# Patient Record
Sex: MALE | Race: WHITE | NOT HISPANIC OR LATINO | Employment: UNEMPLOYED | URBAN - METROPOLITAN AREA
[De-identification: names, ages, dates, MRNs, and addresses within clinical notes are randomized per-mention and may not be internally consistent; named-entity substitution may affect disease eponyms.]

---

## 2017-08-23 ENCOUNTER — APPOINTMENT (OUTPATIENT)
Dept: CT IMAGING | Facility: HOSPITAL | Age: 52
End: 2017-08-23

## 2017-08-23 ENCOUNTER — HOSPITAL ENCOUNTER (OUTPATIENT)
Facility: HOSPITAL | Age: 52
Setting detail: OBSERVATION
Discharge: HOME | End: 2017-08-24
Attending: EMERGENCY MEDICINE | Admitting: INTERNAL MEDICINE

## 2017-08-23 ENCOUNTER — OFFICE VISIT (OUTPATIENT)
Dept: RETAIL CLINIC | Facility: CLINIC | Age: 52
End: 2017-08-23

## 2017-08-23 VITALS
OXYGEN SATURATION: 98 % | RESPIRATION RATE: 18 BRPM | DIASTOLIC BLOOD PRESSURE: 60 MMHG | SYSTOLIC BLOOD PRESSURE: 92 MMHG | HEART RATE: 84 BPM | TEMPERATURE: 98.2 F

## 2017-08-23 DIAGNOSIS — K92.1 BLACK TARRY STOOLS: Primary | ICD-10-CM

## 2017-08-23 DIAGNOSIS — R10.30 LOWER ABDOMINAL PAIN: ICD-10-CM

## 2017-08-23 DIAGNOSIS — K92.2 GASTROINTESTINAL HEMORRHAGE, UNSPECIFIED GASTROINTESTINAL HEMORRHAGE TYPE: Primary | ICD-10-CM

## 2017-08-23 DIAGNOSIS — K63.1 SMALL BOWEL PERFORATION (HCC): ICD-10-CM

## 2017-08-23 DIAGNOSIS — I95.9 HYPOTENSION, UNSPECIFIED HYPOTENSION TYPE: ICD-10-CM

## 2017-08-23 DIAGNOSIS — F10.229 ALCOHOL DEPENDENCE WITH INTOXICATION WITH COMPLICATION (HCC): ICD-10-CM

## 2017-08-23 DIAGNOSIS — K92.1 MELENA: ICD-10-CM

## 2017-08-23 PROBLEM — K27.9 PUD (PEPTIC ULCER DISEASE): Status: ACTIVE | Noted: 2017-08-23

## 2017-08-23 PROBLEM — M54.50 CHRONIC LOW BACK PAIN: Status: ACTIVE | Noted: 2017-08-23

## 2017-08-23 PROBLEM — G89.29 CHRONIC LOW BACK PAIN: Status: ACTIVE | Noted: 2017-08-23

## 2017-08-23 PROBLEM — R10.84 GENERALIZED ABDOMINAL PAIN: Status: ACTIVE | Noted: 2017-08-23

## 2017-08-23 PROBLEM — F10.10 ALCOHOL ABUSE: Status: ACTIVE | Noted: 2017-08-23

## 2017-08-23 LAB
ABO GROUP BLD: NORMAL
ALBUMIN SERPL-MCNC: 3.8 G/DL (ref 3.5–5.2)
ALBUMIN/GLOB SERPL: 1.6 G/DL
ALP SERPL-CCNC: 44 U/L (ref 39–117)
ALT SERPL W P-5'-P-CCNC: 15 U/L (ref 1–41)
ANION GAP SERPL CALCULATED.3IONS-SCNC: 15.8 MMOL/L
AST SERPL-CCNC: 27 U/L (ref 1–40)
BASOPHILS # BLD AUTO: 0.08 10*3/MM3 (ref 0–0.2)
BASOPHILS NFR BLD AUTO: 1 % (ref 0–1.5)
BILIRUB SERPL-MCNC: <0.2 MG/DL (ref 0.1–1.2)
BLD GP AB SCN SERPL QL: NEGATIVE
BUN BLD-MCNC: 8 MG/DL (ref 6–20)
BUN/CREAT SERPL: 10.1 (ref 7–25)
CALCIUM SPEC-SCNC: 8.2 MG/DL (ref 8.6–10.5)
CHLORIDE SERPL-SCNC: 105 MMOL/L (ref 98–107)
CO2 SERPL-SCNC: 20.2 MMOL/L (ref 22–29)
CREAT BLD-MCNC: 0.79 MG/DL (ref 0.76–1.27)
D-LACTATE SERPL-SCNC: 1.9 MMOL/L (ref 0.5–2)
D-LACTATE SERPL-SCNC: 2.2 MMOL/L (ref 0.5–2)
DEPRECATED RDW RBC AUTO: 51.9 FL (ref 37–54)
EOSINOPHIL # BLD AUTO: 0.23 10*3/MM3 (ref 0–0.7)
EOSINOPHIL NFR BLD AUTO: 2.9 % (ref 0.3–6.2)
ERYTHROCYTE [DISTWIDTH] IN BLOOD BY AUTOMATED COUNT: 13.7 % (ref 11.5–14.5)
ETHANOL BLD-MCNC: 287 MG/DL (ref 0–10)
ETHANOL UR QL: 0.29 %
GFR SERPL CREATININE-BSD FRML MDRD: 102 ML/MIN/1.73
GLOBULIN UR ELPH-MCNC: 2.4 GM/DL
GLUCOSE BLD-MCNC: 86 MG/DL (ref 65–99)
HCT VFR BLD AUTO: 37 % (ref 40.4–52.2)
HGB BLD-MCNC: 12.4 G/DL (ref 13.7–17.6)
HOLD SPECIMEN: NORMAL
HOLD SPECIMEN: NORMAL
IMM GRANULOCYTES # BLD: 0 10*3/MM3 (ref 0–0.03)
IMM GRANULOCYTES NFR BLD: 0 % (ref 0–0.5)
INR PPP: 0.94 (ref 0.9–1.1)
LYMPHOCYTES # BLD AUTO: 3.66 10*3/MM3 (ref 0.9–4.8)
LYMPHOCYTES NFR BLD AUTO: 46.9 % (ref 19.6–45.3)
MCH RBC QN AUTO: 34.4 PG (ref 27–32.7)
MCHC RBC AUTO-ENTMCNC: 33.5 G/DL (ref 32.6–36.4)
MCV RBC AUTO: 102.8 FL (ref 79.8–96.2)
MONOCYTES # BLD AUTO: 0.77 10*3/MM3 (ref 0.2–1.2)
MONOCYTES NFR BLD AUTO: 9.9 % (ref 5–12)
NEUTROPHILS # BLD AUTO: 3.07 10*3/MM3 (ref 1.9–8.1)
NEUTROPHILS NFR BLD AUTO: 39.3 % (ref 42.7–76)
PLATELET # BLD AUTO: 247 10*3/MM3 (ref 140–500)
PMV BLD AUTO: 10 FL (ref 6–12)
POTASSIUM BLD-SCNC: 4.2 MMOL/L (ref 3.5–5.2)
PROT SERPL-MCNC: 6.2 G/DL (ref 6–8.5)
PROTHROMBIN TIME: 12.2 SECONDS (ref 11.7–14.2)
RBC # BLD AUTO: 3.6 10*6/MM3 (ref 4.6–6)
RH BLD: POSITIVE
SODIUM BLD-SCNC: 141 MMOL/L (ref 136–145)
WBC NRBC COR # BLD: 7.81 10*3/MM3 (ref 4.5–10.7)
WHOLE BLOOD HOLD SPECIMEN: NORMAL
WHOLE BLOOD HOLD SPECIMEN: NORMAL

## 2017-08-23 PROCEDURE — 36415 COLL VENOUS BLD VENIPUNCTURE: CPT | Performed by: EMERGENCY MEDICINE

## 2017-08-23 PROCEDURE — 96375 TX/PRO/DX INJ NEW DRUG ADDON: CPT

## 2017-08-23 PROCEDURE — G0378 HOSPITAL OBSERVATION PER HR: HCPCS

## 2017-08-23 PROCEDURE — 96365 THER/PROPH/DIAG IV INF INIT: CPT

## 2017-08-23 PROCEDURE — 86900 BLOOD TYPING SEROLOGIC ABO: CPT | Performed by: EMERGENCY MEDICINE

## 2017-08-23 PROCEDURE — 85610 PROTHROMBIN TIME: CPT | Performed by: PHYSICIAN ASSISTANT

## 2017-08-23 PROCEDURE — 96376 TX/PRO/DX INJ SAME DRUG ADON: CPT

## 2017-08-23 PROCEDURE — 86850 RBC ANTIBODY SCREEN: CPT | Performed by: EMERGENCY MEDICINE

## 2017-08-23 PROCEDURE — 85025 COMPLETE CBC W/AUTO DIFF WBC: CPT | Performed by: EMERGENCY MEDICINE

## 2017-08-23 PROCEDURE — 96366 THER/PROPH/DIAG IV INF ADDON: CPT

## 2017-08-23 PROCEDURE — 86901 BLOOD TYPING SEROLOGIC RH(D): CPT | Performed by: EMERGENCY MEDICINE

## 2017-08-23 PROCEDURE — 80307 DRUG TEST PRSMV CHEM ANLYZR: CPT | Performed by: PHYSICIAN ASSISTANT

## 2017-08-23 PROCEDURE — 83605 ASSAY OF LACTIC ACID: CPT | Performed by: EMERGENCY MEDICINE

## 2017-08-23 PROCEDURE — 25010000002 LORAZEPAM PER 2 MG: Performed by: INTERNAL MEDICINE

## 2017-08-23 PROCEDURE — 80053 COMPREHEN METABOLIC PANEL: CPT | Performed by: EMERGENCY MEDICINE

## 2017-08-23 PROCEDURE — 25010000002 MAGNESIUM SULFATE PER 500 MG OF MAGNESIUM: Performed by: INTERNAL MEDICINE

## 2017-08-23 PROCEDURE — 25010000002 THIAMINE PER 100 MG: Performed by: INTERNAL MEDICINE

## 2017-08-23 PROCEDURE — 74176 CT ABD & PELVIS W/O CONTRAST: CPT

## 2017-08-23 PROCEDURE — 99284 EMERGENCY DEPT VISIT MOD MDM: CPT

## 2017-08-23 PROCEDURE — 99213 OFFICE O/P EST LOW 20 MIN: CPT | Performed by: NURSE PRACTITIONER

## 2017-08-23 RX ORDER — SODIUM CHLORIDE 0.9 % (FLUSH) 0.9 %
1-10 SYRINGE (ML) INJECTION AS NEEDED
Status: DISCONTINUED | OUTPATIENT
Start: 2017-08-23 | End: 2017-08-24 | Stop reason: HOSPADM

## 2017-08-23 RX ORDER — FOLIC ACID 1 MG/1
1 TABLET ORAL DAILY
Status: DISCONTINUED | OUTPATIENT
Start: 2017-08-24 | End: 2017-08-24 | Stop reason: HOSPADM

## 2017-08-23 RX ORDER — SODIUM CHLORIDE 9 MG/ML
100 INJECTION, SOLUTION INTRAVENOUS CONTINUOUS
Status: DISCONTINUED | OUTPATIENT
Start: 2017-08-23 | End: 2017-08-23

## 2017-08-23 RX ORDER — ONDANSETRON 2 MG/ML
4 INJECTION INTRAMUSCULAR; INTRAVENOUS EVERY 6 HOURS PRN
Status: DISCONTINUED | OUTPATIENT
Start: 2017-08-23 | End: 2017-08-24 | Stop reason: HOSPADM

## 2017-08-23 RX ORDER — HYDROCODONE BITARTRATE AND ACETAMINOPHEN 5; 325 MG/1; MG/1
1 TABLET ORAL EVERY 4 HOURS PRN
Status: DISCONTINUED | OUTPATIENT
Start: 2017-08-23 | End: 2017-08-24 | Stop reason: HOSPADM

## 2017-08-23 RX ORDER — THIAMINE MONONITRATE (VIT B1) 100 MG
100 TABLET ORAL DAILY
Status: DISCONTINUED | OUTPATIENT
Start: 2017-08-24 | End: 2017-08-24 | Stop reason: HOSPADM

## 2017-08-23 RX ORDER — NITROGLYCERIN 0.4 MG/1
0.4 TABLET SUBLINGUAL
Status: DISCONTINUED | OUTPATIENT
Start: 2017-08-23 | End: 2017-08-24 | Stop reason: HOSPADM

## 2017-08-23 RX ORDER — LORAZEPAM 2 MG/ML
2 INJECTION INTRAMUSCULAR
Status: DISCONTINUED | OUTPATIENT
Start: 2017-08-23 | End: 2017-08-24 | Stop reason: HOSPADM

## 2017-08-23 RX ORDER — LORAZEPAM 2 MG/ML
1 INJECTION INTRAMUSCULAR
Status: DISCONTINUED | OUTPATIENT
Start: 2017-08-23 | End: 2017-08-24 | Stop reason: HOSPADM

## 2017-08-23 RX ORDER — OMEPRAZOLE 20 MG/1
20 CAPSULE, DELAYED RELEASE ORAL DAILY
COMMUNITY
End: 2017-08-24 | Stop reason: HOSPADM

## 2017-08-23 RX ORDER — DIPHENOXYLATE HYDROCHLORIDE AND ATROPINE SULFATE 2.5; .025 MG/1; MG/1
1 TABLET ORAL DAILY
Status: DISCONTINUED | OUTPATIENT
Start: 2017-08-24 | End: 2017-08-24 | Stop reason: HOSPADM

## 2017-08-23 RX ORDER — SODIUM CHLORIDE 9 MG/ML
100 INJECTION, SOLUTION INTRAVENOUS CONTINUOUS
Status: DISCONTINUED | OUTPATIENT
Start: 2017-08-26 | End: 2017-08-24 | Stop reason: HOSPADM

## 2017-08-23 RX ORDER — NAPROXEN SODIUM 220 MG
220 TABLET ORAL 2 TIMES DAILY PRN
COMMUNITY
End: 2017-08-24 | Stop reason: HOSPADM

## 2017-08-23 RX ORDER — SODIUM CHLORIDE 0.9 % (FLUSH) 0.9 %
10 SYRINGE (ML) INJECTION AS NEEDED
Status: DISCONTINUED | OUTPATIENT
Start: 2017-08-23 | End: 2017-08-24 | Stop reason: HOSPADM

## 2017-08-23 RX ORDER — PANTOPRAZOLE SODIUM 40 MG/10ML
80 INJECTION, POWDER, LYOPHILIZED, FOR SOLUTION INTRAVENOUS ONCE
Status: COMPLETED | OUTPATIENT
Start: 2017-08-23 | End: 2017-08-23

## 2017-08-23 RX ORDER — ACETAMINOPHEN 325 MG/1
650 TABLET ORAL EVERY 6 HOURS PRN
Status: DISCONTINUED | OUTPATIENT
Start: 2017-08-23 | End: 2017-08-24 | Stop reason: HOSPADM

## 2017-08-23 RX ORDER — LORAZEPAM 1 MG/1
2 TABLET ORAL
Status: DISCONTINUED | OUTPATIENT
Start: 2017-08-23 | End: 2017-08-24 | Stop reason: HOSPADM

## 2017-08-23 RX ORDER — LORAZEPAM 1 MG/1
1 TABLET ORAL
Status: DISCONTINUED | OUTPATIENT
Start: 2017-08-23 | End: 2017-08-24 | Stop reason: HOSPADM

## 2017-08-23 RX ORDER — ONDANSETRON 4 MG/1
4 TABLET, FILM COATED ORAL EVERY 6 HOURS PRN
Status: DISCONTINUED | OUTPATIENT
Start: 2017-08-23 | End: 2017-08-24 | Stop reason: HOSPADM

## 2017-08-23 RX ORDER — NICOTINE 21 MG/24HR
1 PATCH, TRANSDERMAL 24 HOURS TRANSDERMAL NIGHTLY
Status: DISCONTINUED | OUTPATIENT
Start: 2017-08-23 | End: 2017-08-24 | Stop reason: HOSPADM

## 2017-08-23 RX ORDER — ONDANSETRON 4 MG/1
4 TABLET, ORALLY DISINTEGRATING ORAL EVERY 6 HOURS PRN
Status: DISCONTINUED | OUTPATIENT
Start: 2017-08-23 | End: 2017-08-24 | Stop reason: HOSPADM

## 2017-08-23 RX ADMIN — NICOTINE 1 PATCH: 21 PATCH, EXTENDED RELEASE TRANSDERMAL at 20:50

## 2017-08-23 RX ADMIN — PANTOPRAZOLE SODIUM 8 MG/HR: 40 INJECTION, POWDER, FOR SOLUTION INTRAVENOUS at 19:43

## 2017-08-23 RX ADMIN — PANTOPRAZOLE SODIUM 8 MG/HR: 40 INJECTION, POWDER, FOR SOLUTION INTRAVENOUS at 15:25

## 2017-08-23 RX ADMIN — LORAZEPAM 2 MG: 2 INJECTION INTRAMUSCULAR; INTRAVENOUS at 18:38

## 2017-08-23 RX ADMIN — LORAZEPAM 1 MG: 2 INJECTION INTRAMUSCULAR; INTRAVENOUS at 22:15

## 2017-08-23 RX ADMIN — PANTOPRAZOLE SODIUM 80 MG: 40 INJECTION, POWDER, FOR SOLUTION INTRAVENOUS at 15:16

## 2017-08-23 RX ADMIN — FOLIC ACID 100 ML/HR: 5 INJECTION, SOLUTION INTRAMUSCULAR; INTRAVENOUS; SUBCUTANEOUS at 20:50

## 2017-08-23 NOTE — PLAN OF CARE
Problem: Fall Risk (Adult)  Goal: Identify Related Risk Factors and Signs and Symptoms  Outcome: Ongoing (interventions implemented as appropriate)    08/23/17 4984   Fall Risk   Fall Risk: Related Risk Factors slipper/uneven surfaces;environment unfamiliar;gait/mobility problems;confusion/agitation   Fall Risk: Signs and Symptoms presence of risk factors

## 2017-08-23 NOTE — PROGRESS NOTES
Subjective   Marcello Nguyen is a 52 y.o. male.     HPI Comments: Pt reports he has been having stomach pain, leg cramps and dark tarry stool. Reports dark tarry stools for 3 weeks. Leg cramps started couple days ago. Reports he has been drinking 2 beers daily. Reports he has had a hernia for appx 8 months     Abdominal Pain   This is a new problem. The current episode started 1 to 4 weeks ago (3 weeks). The onset quality is gradual. The problem occurs intermittently. Duration: 3  The problem has been waxing and waning. The pain is located in the generalized abdominal region. The pain is mild. The quality of the pain is dull. The abdominal pain does not radiate. Associated symptoms include nausea. Pertinent negatives include no fever or vomiting. Nothing aggravates the pain. The pain is relieved by nothing. Treatments tried: aleve. The treatment provided no relief. His past medical history is significant for abdominal surgery. hx of small intestine surgery        The following portions of the patient's history were reviewed and updated as appropriate: allergies, current medications, past family history, past medical history, past social history, past surgical history and problem list.    Review of Systems   Constitutional: Positive for chills. Negative for fever.   HENT: Negative.    Eyes: Negative.    Respiratory: Negative.    Cardiovascular: Negative.    Gastrointestinal: Positive for abdominal distention, abdominal pain and nausea. Negative for vomiting.        Black stool   Endocrine: Negative.    Genitourinary: Negative.    Musculoskeletal: Positive for back pain.   Skin: Negative.    Allergic/Immunologic: Negative.    Neurological: Negative.    Hematological: Negative.    Psychiatric/Behavioral: Negative.        Objective   Physical Exam   Constitutional: He is oriented to person, place, and time. Vital signs are normal. He appears well-developed and well-nourished.   Smells of strong alcohol   HENT:    Head: Normocephalic and atraumatic.   Right Ear: Hearing, tympanic membrane, external ear and ear canal normal.   Left Ear: Hearing, tympanic membrane, external ear and ear canal normal.   Nose: Nose normal. Right sinus exhibits no maxillary sinus tenderness and no frontal sinus tenderness. Left sinus exhibits no maxillary sinus tenderness and no frontal sinus tenderness.   Mouth/Throat: Uvula is midline, oropharynx is clear and moist and mucous membranes are normal. No tonsillar exudate.   Eyes: Conjunctivae and lids are normal. Pupils are equal, round, and reactive to light.   Neck: Trachea normal and normal range of motion. Neck supple.   Cardiovascular: Normal rate, regular rhythm, S1 normal, S2 normal and normal heart sounds.    Pulmonary/Chest: Effort normal and breath sounds normal.   Abdominal: Soft. Normal appearance. Bowel sounds are decreased. There is no tenderness. There is no rigidity, no rebound and no guarding. A hernia is present. Hernia confirmed positive in the ventral area.   Old midline abdominal scar noted    Musculoskeletal: Normal range of motion.   Lymphadenopathy:     He has no cervical adenopathy.   Neurological: He is alert and oriented to person, place, and time.   Skin: Skin is warm, dry and intact. No rash noted.   Psychiatric: He has a normal mood and affect. His speech is normal and behavior is normal.   Vitals reviewed.      Assessment/Plan   Problems Addressed this Visit     None      Visit Diagnoses     Black tarry stools    -  Primary    Hypotension, unspecified hypotension type        Lower abdominal pain

## 2017-08-23 NOTE — PATIENT INSTRUCTIONS
Go to ER asap. Pt understands the risk and consequences of not going to ER including bleeding which could lead to death. Pt understands he needs a higher level of care which may include labs, radiology, IV's. Reports sister in law will drive him to ER. Refuses EMS transport. Report called to [facility]

## 2017-08-23 NOTE — ED PROVIDER NOTES
"EMERGENCY DEPARTMENT ENCOUNTER    CHIEF COMPLAINT  Chief Complaint: abd pain  History given by:pt  History limited by:none  Room Number: 34/34  PMD: No Known Provider      HPI:  Pt is a 52 y.o. male who presents with a possible flare of his peptic ulcers. He began experiencing abdominal pain about 1-2 months ago and black/tarry stool about 1 month ago. Pt avoided coming to the ER until he was advised to come by his wife.     He has had 2 perforated ulcer repairs in the past, first one being in 2012 by Dr. Rivera and second in 2016. Pt admits to drinking EtOH (Beer) regularly and reports his last drink was this morning. He currently denies experiencing hematemesis or other symptoms.     Duration: about 1 or 2 months  Timing:constant  Location:unspecified  Radiation:none stated  Quality:\"pain\"   Intensity/Severity:moderate  Progression:unchanged  Associated Symptoms:black/tarry stool  Aggravating Factors:none stated  Alleviating Factors:none stated  Previous Episodes:Pt has a h/o ulcers  Treatment before arrival:none stated    MEDICAL RECORD REVIEW  Pt has a history of peptic ulcer disease    PAST MEDICAL HISTORY  Active Ambulatory Problems     Diagnosis Date Noted   • No Active Ambulatory Problems     Resolved Ambulatory Problems     Diagnosis Date Noted   • No Resolved Ambulatory Problems     Past Medical History:   Diagnosis Date   • Back pain    • History of stomach ulcers        PAST SURGICAL HISTORY  Past Surgical History:   Procedure Laterality Date   • COLON SURGERY         FAMILY HISTORY  Family History   Problem Relation Age of Onset   • Cancer Father        SOCIAL HISTORY  Social History     Social History   • Marital status: Unknown     Spouse name: N/A   • Number of children: N/A   • Years of education: N/A     Occupational History   • Not on file.     Social History Main Topics   • Smoking status: Current Every Day Smoker     Packs/day: 1.00   • Smokeless tobacco: Not on file   • Alcohol use Yes      " Comment: 2-3 beers a day   • Drug use: Yes     Special: Marijuana   • Sexual activity: Defer     Other Topics Concern   • Not on file     Social History Narrative   • No narrative on file       ALLERGIES  Review of patient's allergies indicates no known allergies.    REVIEW OF SYSTEMS  Review of Systems   Constitutional: Negative for activity change, appetite change and fever.   HENT: Negative for congestion and sore throat.    Eyes: Negative.    Respiratory: Negative for cough and shortness of breath.    Cardiovascular: Negative for chest pain and leg swelling.   Gastrointestinal: Positive for abdominal pain and blood in stool (black/tarry). Negative for diarrhea and vomiting (hematemesis included).   Endocrine: Negative.    Genitourinary: Negative for decreased urine volume and dysuria.   Musculoskeletal: Negative for neck pain.   Skin: Negative for rash and wound.   Allergic/Immunologic: Negative.    Neurological: Negative for weakness, numbness and headaches.   Hematological: Negative.    Psychiatric/Behavioral: Negative.    All other systems reviewed and are negative.      PHYSICAL EXAM  ED Triage Vitals   Temp Heart Rate Resp BP SpO2   08/23/17 1359 08/23/17 1359 08/23/17 1359 08/23/17 1359 08/23/17 1359   97 °F (36.1 °C) 93 18 106/66 98 %      Temp src Heart Rate Source Patient Position BP Location FiO2 (%)   08/23/17 1359 08/23/17 1359 08/23/17 1359 08/23/17 1359 --   Tympanic Monitor Standing Right arm        Physical Exam   Constitutional: He is oriented to person, place, and time and well-developed, well-nourished, and in no distress. No distress.   Smells of alcohol   HENT:   Head: Normocephalic and atraumatic.   Mouth/Throat: Oropharynx is clear and moist.   Eyes: EOM are normal. Pupils are equal, round, and reactive to light.   Neck: Normal range of motion. Neck supple.   Cardiovascular: Normal rate, regular rhythm and normal heart sounds.    Pulmonary/Chest: Effort normal and breath sounds normal. No  respiratory distress. He has no wheezes. He exhibits no tenderness.   Abdominal: Soft. He exhibits no distension. There is tenderness (epigastric). There is no rebound and no guarding.   Genitourinary: Rectal exam shows guaiac positive stool (black stool).   Musculoskeletal: Normal range of motion. He exhibits no edema.   Lymphadenopathy:     He has no cervical adenopathy.   Neurological: He is alert and oriented to person, place, and time. He has normal sensation and normal strength.   Skin: Skin is warm and dry. No rash noted. No pallor.   Psychiatric: Mood, memory, affect and judgment normal.   Nursing note and vitals reviewed.      LAB RESULTS  Recent Results (from the past 24 hour(s))   Comprehensive Metabolic Panel    Collection Time: 08/23/17  2:37 PM   Result Value Ref Range    Glucose 86 65 - 99 mg/dL    BUN 8 6 - 20 mg/dL    Creatinine 0.79 0.76 - 1.27 mg/dL    Sodium 141 136 - 145 mmol/L    Potassium 4.2 3.5 - 5.2 mmol/L    Chloride 105 98 - 107 mmol/L    CO2 20.2 (L) 22.0 - 29.0 mmol/L    Calcium 8.2 (L) 8.6 - 10.5 mg/dL    Total Protein 6.2 6.0 - 8.5 g/dL    Albumin 3.80 3.50 - 5.20 g/dL    ALT (SGPT) 15 1 - 41 U/L    AST (SGOT) 27 1 - 40 U/L    Alkaline Phosphatase 44 39 - 117 U/L    Total Bilirubin <0.2 0.1 - 1.2 mg/dL    eGFR Non African Amer 102 >60 mL/min/1.73    Globulin 2.4 gm/dL    A/G Ratio 1.6 g/dL    BUN/Creatinine Ratio 10.1 7.0 - 25.0    Anion Gap 15.8 mmol/L   Type & Screen    Collection Time: 08/23/17  2:37 PM   Result Value Ref Range    ABO Type A     RH type Positive     Antibody Screen Negative    Lactic Acid, Plasma    Collection Time: 08/23/17  2:37 PM   Result Value Ref Range    Lactate 2.2 (C) 0.5 - 2.0 mmol/L   Light Blue Top    Collection Time: 08/23/17  2:37 PM   Result Value Ref Range    Extra Tube hold for add-on    Green Top (Gel)    Collection Time: 08/23/17  2:37 PM   Result Value Ref Range    Extra Tube Hold for add-ons.    Lavender Top    Collection Time: 08/23/17  2:37  PM   Result Value Ref Range    Extra Tube hold for add-on    Gold Top - SST    Collection Time: 08/23/17  2:37 PM   Result Value Ref Range    Extra Tube Hold for add-ons.    CBC Auto Differential    Collection Time: 08/23/17  2:37 PM   Result Value Ref Range    WBC 7.81 4.50 - 10.70 10*3/mm3    RBC 3.60 (L) 4.60 - 6.00 10*6/mm3    Hemoglobin 12.4 (L) 13.7 - 17.6 g/dL    Hematocrit 37.0 (L) 40.4 - 52.2 %    .8 (H) 79.8 - 96.2 fL    MCH 34.4 (H) 27.0 - 32.7 pg    MCHC 33.5 32.6 - 36.4 g/dL    RDW 13.7 11.5 - 14.5 %    RDW-SD 51.9 37.0 - 54.0 fl    MPV 10.0 6.0 - 12.0 fL    Platelets 247 140 - 500 10*3/mm3    Neutrophil % 39.3 (L) 42.7 - 76.0 %    Lymphocyte % 46.9 (H) 19.6 - 45.3 %    Monocyte % 9.9 5.0 - 12.0 %    Eosinophil % 2.9 0.3 - 6.2 %    Basophil % 1.0 0.0 - 1.5 %    Immature Grans % 0.0 0.0 - 0.5 %    Neutrophils, Absolute 3.07 1.90 - 8.10 10*3/mm3    Lymphocytes, Absolute 3.66 0.90 - 4.80 10*3/mm3    Monocytes, Absolute 0.77 0.20 - 1.20 10*3/mm3    Eosinophils, Absolute 0.23 0.00 - 0.70 10*3/mm3    Basophils, Absolute 0.08 0.00 - 0.20 10*3/mm3    Immature Grans, Absolute 0.00 0.00 - 0.03 10*3/mm3   Ethanol    Collection Time: 08/23/17  2:37 PM   Result Value Ref Range    Ethanol 287 (C) 0 - 10 mg/dL    Ethanol % 0.287 %   Protime-INR    Collection Time: 08/23/17  2:37 PM   Result Value Ref Range    Protime 12.2 11.7 - 14.2 Seconds    INR 0.94 0.90 - 1.10       I ordered the above labs and reviewed the results    RADIOLOGY  CT Abdomen Pelvis Without Contrast      Negative for any acute process       I ordered the above noted radiological studies and reviewed the images on the PACS system.          COURSE & MEDICAL DECISION MAKING  Pertinent Labs and Imaging studies that were ordered and reviewed are noted above.  Results were reviewed/discussed with the patient and they were also made aware of online assess.  Pt also made aware that some labs, such as cultures, will not be resulted during ER visit and  "follow up with PMD is necessary.       PROGRESS AND CONSULTS    Progress Notes:    1409: Ordered CBC, lactic acid and CMP per protocol.     1500: Ordered EtOH and PT-INR for further evaluation. Ordered protonix for abd pain.     1517: Reviewed pt's history and workup with Dr. Howell.  After a bedside evaluation; Dr Howell agrees with the plan of care.    1720: Spoke with Dr. Marquez [facility] concerning pt's history, pertinent workup and concern for small bowel perforation and gastrointestinal hemorrhaging. He agrees to admit.     1723: Based on the patient's lab findings and presenting symptoms, the doctor and I feel it is appropriate to admit the patient for further management, evaluation, and treatment.  I have discussed this with the admitting team.  I have also discussed this with the patient/family.  They are in agreement with admission.        MEDICATIONS GIVEN IN ER  Medications   sodium chloride 0.9 % flush 10 mL (not administered)   pantoprazole (PROTONIX) injection 80 mg (80 mg Intravenous Given 8/23/17 1516)     And   pantoprazole (PROTONIX) 40 mg/100 mL (0.4 mg/mL) in 0.9% NS IVPB (8 mg/hr Intravenous New Bag 8/23/17 1525)       BP 93/58  Pulse 71  Temp 97 °F (36.1 °C) (Tympanic)   Resp 18  Ht 68\" (172.7 cm)  Wt 140 lb (63.5 kg)  SpO2 94%  BMI 21.29 kg/m2      DIAGNOSIS  Final diagnoses:   Gastrointestinal hemorrhage, unspecified gastrointestinal hemorrhage type   Alcohol dependence with intoxication with complication   Small bowel perforation- history of       Documentation assistance provided by melinda Jones for Caitlin Obrien PA-C.  Information recorded by the melinda was done at my direction and has been verified and validated by me.     Cadence Jones  08/23/17 1724       Caitlin Obrien PA-C  08/23/17 1729       Caitlin Obrien PA-C  10/05/17 1442    "

## 2017-08-23 NOTE — ED PROVIDER NOTES
Pt with a hx of peptic ulcers, presents to the ED complaining generalized abd pain for the last 1-2 months and black stool for the last month. Pt's ethanol=287 and Hgb=12.4. On exam, the pt has periumbilical and epigastric abd tenderness and he is tachycardic. I agree with the plan to order a CT Abd/Pelvis for further evaluation.     I supervised care provided by the midlevel provider.    We have discussed this patient's history, physical exam, and treatment plan.   I have reviewed the note and personally saw and examined the patient and agree with the plan of care.    Documentation assistance provided by mireille Cagle and Abraham Zuluaga for Dr. Howell.  Information recorded by the melinda was done at my direction and has been verified and validated by me.       Abraham Zuluaga  08/23/17 1531       Teresa Cagle  08/23/17 1607       Justin Howell MD  08/25/17 1100

## 2017-08-24 ENCOUNTER — ANESTHESIA (OUTPATIENT)
Dept: GASTROENTEROLOGY | Facility: HOSPITAL | Age: 52
End: 2017-08-24

## 2017-08-24 ENCOUNTER — ANESTHESIA EVENT (OUTPATIENT)
Dept: GASTROENTEROLOGY | Facility: HOSPITAL | Age: 52
End: 2017-08-24

## 2017-08-24 VITALS
RESPIRATION RATE: 14 BRPM | TEMPERATURE: 98.3 F | DIASTOLIC BLOOD PRESSURE: 81 MMHG | HEART RATE: 72 BPM | OXYGEN SATURATION: 96 % | WEIGHT: 124.6 LBS | HEIGHT: 68 IN | BODY MASS INDEX: 18.88 KG/M2 | SYSTOLIC BLOOD PRESSURE: 111 MMHG

## 2017-08-24 PROBLEM — R10.84 GENERALIZED ABDOMINAL PAIN: Status: RESOLVED | Noted: 2017-08-23 | Resolved: 2017-08-24

## 2017-08-24 PROBLEM — K92.1 MELENA: Status: RESOLVED | Noted: 2017-08-23 | Resolved: 2017-08-24

## 2017-08-24 LAB
ANION GAP SERPL CALCULATED.3IONS-SCNC: 11.8 MMOL/L
BASOPHILS # BLD AUTO: 0.07 10*3/MM3 (ref 0–0.2)
BASOPHILS NFR BLD AUTO: 0.9 % (ref 0–1.5)
BUN BLD-MCNC: 10 MG/DL (ref 6–20)
BUN/CREAT SERPL: 14.1 (ref 7–25)
CALCIUM SPEC-SCNC: 7.8 MG/DL (ref 8.6–10.5)
CHLORIDE SERPL-SCNC: 105 MMOL/L (ref 98–107)
CO2 SERPL-SCNC: 23.2 MMOL/L (ref 22–29)
CREAT BLD-MCNC: 0.71 MG/DL (ref 0.76–1.27)
D-LACTATE SERPL-SCNC: 1.1 MMOL/L (ref 0.5–2)
DEPRECATED RDW RBC AUTO: 50.6 FL (ref 37–54)
EOSINOPHIL # BLD AUTO: 0.25 10*3/MM3 (ref 0–0.7)
EOSINOPHIL NFR BLD AUTO: 3.2 % (ref 0.3–6.2)
ERYTHROCYTE [DISTWIDTH] IN BLOOD BY AUTOMATED COUNT: 13.4 % (ref 11.5–14.5)
GFR SERPL CREATININE-BSD FRML MDRD: 116 ML/MIN/1.73
GLUCOSE BLD-MCNC: 102 MG/DL (ref 65–99)
HCT VFR BLD AUTO: 34.7 % (ref 40.4–52.2)
HCT VFR BLD AUTO: 36.3 % (ref 40.4–52.2)
HCT VFR BLD AUTO: 37.8 % (ref 40.4–52.2)
HGB BLD-MCNC: 11.9 G/DL (ref 13.7–17.6)
HGB BLD-MCNC: 12.3 G/DL (ref 13.7–17.6)
HGB BLD-MCNC: 12.4 G/DL (ref 13.7–17.6)
IMM GRANULOCYTES # BLD: 0 10*3/MM3 (ref 0–0.03)
IMM GRANULOCYTES NFR BLD: 0 % (ref 0–0.5)
INR PPP: 1.02 (ref 0.9–1.1)
LYMPHOCYTES # BLD AUTO: 2.33 10*3/MM3 (ref 0.9–4.8)
LYMPHOCYTES NFR BLD AUTO: 30.3 % (ref 19.6–45.3)
MAGNESIUM SERPL-MCNC: 2.7 MG/DL (ref 1.6–2.6)
MCH RBC QN AUTO: 34.9 PG (ref 27–32.7)
MCHC RBC AUTO-ENTMCNC: 34.2 G/DL (ref 32.6–36.4)
MCV RBC AUTO: 102.3 FL (ref 79.8–96.2)
MONOCYTES # BLD AUTO: 0.67 10*3/MM3 (ref 0.2–1.2)
MONOCYTES NFR BLD AUTO: 8.7 % (ref 5–12)
NEUTROPHILS # BLD AUTO: 4.38 10*3/MM3 (ref 1.9–8.1)
NEUTROPHILS NFR BLD AUTO: 56.9 % (ref 42.7–76)
PLATELET # BLD AUTO: 234 10*3/MM3 (ref 140–500)
PMV BLD AUTO: 10.1 FL (ref 6–12)
POTASSIUM BLD-SCNC: 4.5 MMOL/L (ref 3.5–5.2)
PROTHROMBIN TIME: 13 SECONDS (ref 11.7–14.2)
RBC # BLD AUTO: 3.55 10*6/MM3 (ref 4.6–6)
SODIUM BLD-SCNC: 140 MMOL/L (ref 136–145)
WBC NRBC COR # BLD: 7.7 10*3/MM3 (ref 4.5–10.7)

## 2017-08-24 PROCEDURE — 88305 TISSUE EXAM BY PATHOLOGIST: CPT | Performed by: INTERNAL MEDICINE

## 2017-08-24 PROCEDURE — 80048 BASIC METABOLIC PNL TOTAL CA: CPT | Performed by: INTERNAL MEDICINE

## 2017-08-24 PROCEDURE — 43239 EGD BIOPSY SINGLE/MULTIPLE: CPT | Performed by: INTERNAL MEDICINE

## 2017-08-24 PROCEDURE — 85018 HEMOGLOBIN: CPT | Performed by: INTERNAL MEDICINE

## 2017-08-24 PROCEDURE — 96366 THER/PROPH/DIAG IV INF ADDON: CPT

## 2017-08-24 PROCEDURE — 87081 CULTURE SCREEN ONLY: CPT | Performed by: INTERNAL MEDICINE

## 2017-08-24 PROCEDURE — 99243 OFF/OP CNSLTJ NEW/EST LOW 30: CPT | Performed by: INTERNAL MEDICINE

## 2017-08-24 PROCEDURE — G0378 HOSPITAL OBSERVATION PER HR: HCPCS

## 2017-08-24 PROCEDURE — 25010000002 PROPOFOL 10 MG/ML EMULSION: Performed by: NURSE ANESTHETIST, CERTIFIED REGISTERED

## 2017-08-24 PROCEDURE — 85014 HEMATOCRIT: CPT | Performed by: INTERNAL MEDICINE

## 2017-08-24 PROCEDURE — 85025 COMPLETE CBC W/AUTO DIFF WBC: CPT | Performed by: INTERNAL MEDICINE

## 2017-08-24 PROCEDURE — 88312 SPECIAL STAINS GROUP 1: CPT | Performed by: INTERNAL MEDICINE

## 2017-08-24 PROCEDURE — 83605 ASSAY OF LACTIC ACID: CPT | Performed by: INTERNAL MEDICINE

## 2017-08-24 PROCEDURE — 85610 PROTHROMBIN TIME: CPT | Performed by: INTERNAL MEDICINE

## 2017-08-24 PROCEDURE — 83735 ASSAY OF MAGNESIUM: CPT | Performed by: INTERNAL MEDICINE

## 2017-08-24 RX ORDER — FENTANYL CITRATE 50 UG/ML
50 INJECTION, SOLUTION INTRAMUSCULAR; INTRAVENOUS
Status: DISCONTINUED | OUTPATIENT
Start: 2017-08-24 | End: 2017-08-24

## 2017-08-24 RX ORDER — SODIUM CHLORIDE, SODIUM LACTATE, POTASSIUM CHLORIDE, CALCIUM CHLORIDE 600; 310; 30; 20 MG/100ML; MG/100ML; MG/100ML; MG/100ML
INJECTION, SOLUTION INTRAVENOUS CONTINUOUS PRN
Status: DISCONTINUED | OUTPATIENT
Start: 2017-08-24 | End: 2017-08-24 | Stop reason: SURG

## 2017-08-24 RX ORDER — LIDOCAINE HYDROCHLORIDE 20 MG/ML
INJECTION, SOLUTION INFILTRATION; PERINEURAL AS NEEDED
Status: DISCONTINUED | OUTPATIENT
Start: 2017-08-24 | End: 2017-08-24 | Stop reason: SURG

## 2017-08-24 RX ORDER — ONDANSETRON 2 MG/ML
4 INJECTION INTRAMUSCULAR; INTRAVENOUS ONCE AS NEEDED
Status: DISCONTINUED | OUTPATIENT
Start: 2017-08-24 | End: 2017-08-24

## 2017-08-24 RX ORDER — ACETAMINOPHEN 325 MG/1
650 TABLET ORAL EVERY 6 HOURS PRN
Refills: 0
Start: 2017-08-24

## 2017-08-24 RX ORDER — SUCRALFATE ORAL 1 G/10ML
1 SUSPENSION ORAL
Qty: 560 ML | Refills: 0 | Status: SHIPPED | OUTPATIENT
Start: 2017-08-24 | End: 2017-09-07

## 2017-08-24 RX ORDER — PROMETHAZINE HYDROCHLORIDE 25 MG/1
25 SUPPOSITORY RECTAL ONCE AS NEEDED
Status: DISCONTINUED | OUTPATIENT
Start: 2017-08-24 | End: 2017-08-24

## 2017-08-24 RX ORDER — PROMETHAZINE HYDROCHLORIDE 25 MG/ML
6.25 INJECTION, SOLUTION INTRAMUSCULAR; INTRAVENOUS ONCE AS NEEDED
Status: DISCONTINUED | OUTPATIENT
Start: 2017-08-24 | End: 2017-08-24

## 2017-08-24 RX ORDER — NICOTINE 21 MG/24HR
1 PATCH, TRANSDERMAL 24 HOURS TRANSDERMAL NIGHTLY
Qty: 21 PATCH | Refills: 0 | Status: SHIPPED | OUTPATIENT
Start: 2017-08-24

## 2017-08-24 RX ORDER — PROPOFOL 10 MG/ML
VIAL (ML) INTRAVENOUS AS NEEDED
Status: DISCONTINUED | OUTPATIENT
Start: 2017-08-24 | End: 2017-08-24 | Stop reason: SURG

## 2017-08-24 RX ORDER — SODIUM CHLORIDE, SODIUM LACTATE, POTASSIUM CHLORIDE, CALCIUM CHLORIDE 600; 310; 30; 20 MG/100ML; MG/100ML; MG/100ML; MG/100ML
30 INJECTION, SOLUTION INTRAVENOUS CONTINUOUS PRN
Status: DISCONTINUED | OUTPATIENT
Start: 2017-08-24 | End: 2017-08-24 | Stop reason: HOSPADM

## 2017-08-24 RX ORDER — SUCRALFATE ORAL 1 G/10ML
1 SUSPENSION ORAL
Status: DISCONTINUED | OUTPATIENT
Start: 2017-08-24 | End: 2017-08-24 | Stop reason: HOSPADM

## 2017-08-24 RX ORDER — PROMETHAZINE HYDROCHLORIDE 25 MG/1
25 TABLET ORAL ONCE AS NEEDED
Status: DISCONTINUED | OUTPATIENT
Start: 2017-08-24 | End: 2017-08-24

## 2017-08-24 RX ORDER — PANTOPRAZOLE SODIUM 40 MG/1
40 TABLET, DELAYED RELEASE ORAL DAILY
Qty: 30 TABLET | Refills: 0 | Status: SHIPPED | OUTPATIENT
Start: 2017-08-24 | End: 2017-09-15

## 2017-08-24 RX ORDER — PROPOFOL 10 MG/ML
VIAL (ML) INTRAVENOUS CONTINUOUS PRN
Status: DISCONTINUED | OUTPATIENT
Start: 2017-08-24 | End: 2017-08-24 | Stop reason: SURG

## 2017-08-24 RX ADMIN — PANTOPRAZOLE SODIUM 8 MG/HR: 40 INJECTION, POWDER, FOR SOLUTION INTRAVENOUS at 02:26

## 2017-08-24 RX ADMIN — PROPOFOL 30 MG: 10 INJECTION, EMULSION INTRAVENOUS at 13:35

## 2017-08-24 RX ADMIN — Medication 1 TABLET: at 08:40

## 2017-08-24 RX ADMIN — SODIUM CHLORIDE, POTASSIUM CHLORIDE, SODIUM LACTATE AND CALCIUM CHLORIDE: 600; 310; 30; 20 INJECTION, SOLUTION INTRAVENOUS at 13:04

## 2017-08-24 RX ADMIN — LIDOCAINE HYDROCHLORIDE 60 MG: 20 INJECTION, SOLUTION INFILTRATION; PERINEURAL at 13:34

## 2017-08-24 RX ADMIN — Medication 100 MG: at 08:41

## 2017-08-24 RX ADMIN — PANTOPRAZOLE SODIUM 8 MG/HR: 40 INJECTION, POWDER, FOR SOLUTION INTRAVENOUS at 06:24

## 2017-08-24 RX ADMIN — SODIUM CHLORIDE 100 ML/HR: 9 INJECTION, SOLUTION INTRAVENOUS at 06:52

## 2017-08-24 RX ADMIN — PROPOFOL 300 MCG/KG/MIN: 10 INJECTION, EMULSION INTRAVENOUS at 13:34

## 2017-08-24 RX ADMIN — FOLIC ACID 1 MG: 1 TABLET ORAL at 08:41

## 2017-08-24 RX ADMIN — SODIUM CHLORIDE, POTASSIUM CHLORIDE, SODIUM LACTATE AND CALCIUM CHLORIDE 30 ML/HR: 600; 310; 30; 20 INJECTION, SOLUTION INTRAVENOUS at 13:16

## 2017-08-24 RX ADMIN — PROPOFOL 100 MG: 10 INJECTION, EMULSION INTRAVENOUS at 13:34

## 2017-08-24 NOTE — PLAN OF CARE
Problem: GI Endoscopy (Adult)  Goal: Signs and Symptoms of Listed Potential Problems Will be Absent or Manageable (GI Endoscopy)  Outcome: Ongoing (interventions implemented as appropriate)    08/24/17 1303   GI Endoscopy   Problems Assessed (GI Endoscopy) pain

## 2017-08-24 NOTE — ANESTHESIA PREPROCEDURE EVALUATION
Anesthesia Evaluation     Patient summary reviewed and Nursing notes reviewed          Airway   Mallampati: II  TM distance: >3 FB  Neck ROM: full  no difficulty expected  Dental - normal exam     Pulmonary - normal exam   (+) a smoker Current,   Cardiovascular - negative cardio ROS and normal exam        Neuro/Psych- negative ROS  GI/Hepatic/Renal/Endo    (+)  PUD,     Musculoskeletal     (+) back pain,   Abdominal  - normal exam   Substance History - negative use     OB/GYN negative ob/gyn ROS         Other                                        Anesthesia Plan    ASA 3     MAC     intravenous induction   Anesthetic plan and risks discussed with patient.

## 2017-08-24 NOTE — PLAN OF CARE
Problem: Patient Care Overview (Adult)  Goal: Plan of Care Review  Outcome: Ongoing (interventions implemented as appropriate)    08/24/17 0436   Coping/Psychosocial Response Interventions   Plan Of Care Reviewed With patient   Patient Care Overview   Progress no change   Outcome Evaluation   Outcome Summary/Follow up Plan Pt more alert this AM. Ativan per [facility] protocol. Denies pain at this time. Side rails padded. Will continue to monitor,         Problem: Acute Alcohol Withdrawal Syndrome, Risk For/Actual (Adult)  Goal: Signs and Symptoms of Listed Potential Problems Will be Absent or Manageable (Acute Alcohol Withdrawal Syndrome, Risk For/Actual)  Outcome: Ongoing (interventions implemented as appropriate)    Problem: Fall Risk (Adult)  Goal: Absence of Falls  Outcome: Ongoing (interventions implemented as appropriate)    Problem: Gastrointestinal Bleeding (Adult)  Goal: Signs and Symptoms of Listed Potential Problems Will be Absent or Manageable (Gastrointestinal Bleeding)  Outcome: Ongoing (interventions implemented as appropriate)

## 2017-08-24 NOTE — PROGRESS NOTES
Discharge Planning Assessment  [facility]     Patient Name: Marcello Nguyen  MRN: [id number]  Today's Date: 8/24/2017    Admit Date: 8/23/2017          Discharge Needs Assessment       08/24/17 1203    Living Environment    Lives With sibling(s)    Living Arrangements mobile home    Home Accessibility bed and bath on same level;stairs (2 railings present);stairs to enter home    Number of Stairs to Enter Home 3    Stair Railings at Home outside, present at both sides    Type of Financial/Environmental Concern none    Transportation Available car;family or friend will provide    Living Environment    Provides Primary Care For no one    Quality Of Family Relationships supportive    Able to Return to Prior Living Arrangements yes    Discharge Needs Assessment    Concerns To Be Addressed denies needs/concerns at this time    Equipment Currently Used at Home none    Equipment Needed After Discharge none    Discharge Disposition home or self-care            Discharge Plan       08/24/17 1205    Case Management/Social Work Plan    Plan Home; denies needs.     Patient/Family In Agreement With Plan yes    Additional Comments Met with patient at bedside; explained role of CCP, verified facesheet and discussed discharge planning needs.  The patient plans to return home upon d/c to the mobile home that he resides in with his sister and brother in law.  He uses no DME, has 3-4 steps to enter the single story mobile home.   The patient's PCP is Fer Martin and he was provided with the new patient appointment liaison number in case needed.  The [facility] and denies any trouble with remembering to take his medication or with affording his medication.  The patient thinks that he has used [facility] in the past and has been to rehab in [place] previously.  The patient refuses any information or resources on AA or substance abuse.  Nursing staff will be notified that [facility]  can be contacted to eval and provide resources for ETOH if needed.  CCP willl follow to assist with any needs that may arise.  ANA LAURA Eller        Discharge Placement     No information found                Demographic Summary       08/24/17 1202    Referral Information    Admission Type inpatient    Arrived From home or self-care    Referral Source physician;nursing    Reason For Consult discharge planning    Record Reviewed medical record;history and physical    Primary Care Physician Information    Name Dr. Fer Martin            Functional Status       08/24/17 1203    Functional Status Current    Ambulation 2-->assistive person    Transferring 2-->assistive person    Toileting 2-->assistive person    Bathing 2-->assistive person    Dressing 0-->independent    Eating 0-->independent    Communication 0-->understands/communicates without difficulty    Swallowing (if score 2 or more for any item, consult Rehab Services) 0-->swallows foods/liquids without difficulty    Functional Status Prior    Ambulation 0-->independent    Transferring 0-->independent    Toileting 0-->independent    Bathing 0-->independent    Dressing 0-->independent    Eating 0-->independent    Communication 0-->understands/communicates without difficulty    Swallowing 0-->swallows foods/liquids without difficulty    IADL    Medications independent    Meal Preparation assistive person    Housekeeping assistive person    Laundry assistive person    Shopping assistive person    Oral Care independent            Psychosocial     None            Abuse/Neglect     None            Legal     None            Substance Abuse     None            Patient Forms       08/24/17 1202    Patient Forms    Provider Choice List Delivered    Delivered to Patient    Method of delivery In person          Meenakshi Prescott

## 2017-08-24 NOTE — H&P
Name: Marcello Nguyen ADMIT: 2017   : 1965  PCP: No Known Provider    MRN: [id number] LOS: 0 days   AGE/SEX: 52 y.o. male  ROOM: P494/1     Chief Complaint   Patient presents with   • Black or Bloody Stool     SENT FROM [facility] FOR FURTHER EVAL; PT REPORTS BLACK STOOLS X3 WKS       Subjective   Mr. Nguyen is a 52 y.o. male who presents to [facility] complaining of intermittent melena for 3 weeks. He takes aleve daily and has previous hx of PUD. He also drinks daily although will not tell me how much. He denies NV and abdominal pain/distension. Easily agitated currently and still intoxicated on ER labs. Takes aleve for back pain, chronically. Prior GI surgery for PUD with resection.     History of Present Illness    Past Medical History:   Diagnosis Date   • Back pain    • History of stomach ulcers      Past Surgical History:   Procedure Laterality Date   • COLON SURGERY       Family History   Problem Relation Age of Onset   • Cancer Father      Social History   Substance Use Topics   • Smoking status: Current Every Day Smoker     Packs/day: 1.00   • Smokeless tobacco: None   • Alcohol use Yes      Comment: 2-3 beers a day     Prescriptions Prior to Admission   Medication Sig Dispense Refill Last Dose   • omeprazole (priLOSEC) 20 MG capsule Take 20 mg by mouth Daily.      • naproxen sodium (ALEVE) 220 MG tablet Take 220 mg by mouth 2 (Two) Times a Day As Needed (pt reports he has been taking aleve 1-2 tablets every 4 hrs).   Taking Differently     Allergies:  Review of patient's allergies indicates no known allergies.    Review of Systems   Constitutional: Negative for chills and fever.   HENT: Negative for sore throat and trouble swallowing.    Eyes: Negative for pain and visual disturbance.   Respiratory: Negative for cough and shortness of breath.    Cardiovascular: Negative for chest pain and palpitations.   Gastrointestinal: Positive for diarrhea. Negative for constipation,  nausea and vomiting.   Endocrine: Negative for cold intolerance and heat intolerance.   Genitourinary: Negative for difficulty urinating and dysuria.   Musculoskeletal: Negative for neck pain and neck stiffness.   Skin: Negative for pallor and rash.   Allergic/Immunologic: Negative for environmental allergies and food allergies.   Neurological: Negative for seizures and syncope.   Hematological: Negative for adenopathy.   Psychiatric/Behavioral: Positive for agitation. Negative for confusion.        Objective    Vital Signs  Temp:  [97 °F (36.1 °C)-98.7 °F (37.1 °C)] 98.7 °F (37.1 °C)  Heart Rate:  [69-93] 70  Resp:  [18] 18  BP: ()/(58-70) 105/61  SpO2:  [93 %-98 %] 94 %  on   ;   O2 Device: room air  Body mass index is 18.95 kg/(m^2).    Physical Exam   Constitutional: He appears well-developed and well-nourished. No distress.   HENT:   Head: Normocephalic and atraumatic.   Nose: Nose normal.   Eyes: EOM are normal. Pupils are equal, round, and reactive to light. No scleral icterus.   Neck: Normal range of motion. Neck supple.   Cardiovascular: Normal rate, regular rhythm, normal heart sounds and intact distal pulses.    No murmur heard.  Pulmonary/Chest: Effort normal and breath sounds normal. He has no wheezes.   Abdominal: Soft. He exhibits no distension. There is no tenderness.   Musculoskeletal: Normal range of motion. He exhibits no edema.   Neurological: He is alert. No cranial nerve deficit.   Skin: Skin is warm and dry. He is not diaphoretic.   Psychiatric: He has a normal mood and affect. He is agitated.   Nursing note and vitals reviewed.      Results Review:   I reviewed the patient's new clinical results. Reviewed imaging, agree with interpretation. Reviewed telemetry, sinus rhythm. Reviewed prior records.    Results from last 7 days  Lab Units 08/23/17  1437   WBC 10*3/mm3 7.81   HEMOGLOBIN g/dL 12.4*   PLATELETS 10*3/mm3 247     Results from last 7 days  Lab Units 08/23/17  1437   SODIUM  mmol/L 141   POTASSIUM mmol/L 4.2   CHLORIDE mmol/L 105   CO2 mmol/L 20.2*   BUN mg/dL 8   CREATININE mg/dL 0.79   GLUCOSE mg/dL 86   ALBUMIN g/dL 3.80   BILIRUBIN mg/dL <0.2   ALK PHOS U/L 44   AST (SGOT) U/L 27   ALT (SGPT) U/L 15   Estimated Creatinine Clearance: 85.4 mL/min (by C-G formula based on Cr of 0.79).  Results from last 7 days  Lab Units 08/23/17  1437   INR  0.94           CT Abdomen Pelvis Without Contrast   Final Result   1. Previous partial gastrectomy and gastrojejunostomy. No evidence for   acute intra-abdominal process on exam limited without IV contrast.   2. Urinary bladder distention.       This report was finalized on 8/23/2017 4:46 PM by Dr. Eric Atwood MD.            Assessment/Plan   Assessment:     Active Hospital Problems (** Indicates Principal Problem)    Diagnosis Date Noted   • **Gastrointestinal hemorrhage [K92.2] 08/23/2017   • Melena [K92.1] 08/23/2017   • Alcohol abuse [F10.10] 08/23/2017   • PUD (peptic ulcer disease) [K27.9] 08/23/2017   • Generalized abdominal pain [R10.84] 08/23/2017   • Chronic low back pain [M54.5, G89.29] 08/23/2017      Resolved Hospital Problems    Diagnosis Date Noted Date Resolved   No resolved problems to display.         Plan:     - GIB/Melena: Hx PUD, Etoh, and NSAID use. Will give PPI gtt. Monitor HH. Consult GI. He has had previous resection by Dr Rivera, Surgery, can consult if needed.  - EtOH Abuse: CIWA Ativan, MVI, Thiamine, Folate, Mag. Monitor.  - Chronic Low Back pain: Will give tylenol for now. LFTs not elevated.  - Ppx: CELESTINO/SCD  - Code: Full    I discussed the patients findings and my recommendations with patient, family, nursing staff and consulting provider.    Danny Marquez MD  [facility]  08/23/17  10:50 PM

## 2017-08-24 NOTE — PROGRESS NOTES
Name: Marcello Nguyen ADMIT: 2017   : 1965  PCP: Fer Martin MD    MRN: [id number] LOS: 0 days   AGE/SEX: 52 y.o. male  ROOM: [facility]/   Subjective   Subjective  CC/Reason for follow-up: melena  No acute events.  No vomiting or BM last night or this AM.  Has some mild epigastric pain but wants to eat.    Objective   Vital Signs  Temp:  [96.9 °F (36.1 °C)-98.7 °F (37.1 °C)] 98 °F (36.7 °C)  Heart Rate:  [69-93] 72  Resp:  [16-18] 18  BP: ()/(58-74) 120/74  SpO2:  [93 %-98 %] 95 %  on   ;   O2 Device: room air  Body mass index is 18.95 kg/(m^2).    Physical Exam  General: alert, no distress  Head: NCAT  Eyes: EOMI, conjunctivae normal  Mouth/Throat: Oropharynx clear and moist  Neck: supple, no JVD  Cardiac: Normal rate, regular rhythm  Respiratory: Normal effort, clear to ascultation  Abdomen: soft, mild epigastric tenderness without guarding or rebound, bowel sounds are normoactive  Musculoskeletal: no deformity, no tenderness  Extremities: well-perfused, no cyanosis, no edema  Neuro: oriented x3, no gross deficits  Psych: mood and affect appropriate  Results Review:       I reviewed the patient's new clinical results.    Results from last 7 days  Lab Units 17  0645 17  0013 17  1437   WBC 10*3/mm3 7.70  --  7.81   HEMOGLOBIN g/dL 12.4* 11.9* 12.4*   PLATELETS 10*3/mm3 234  --  247     Results from last 7 days  Lab Units 17  0645 17  1437   SODIUM mmol/L 140 141   POTASSIUM mmol/L 4.5 4.2   CHLORIDE mmol/L 105 105   CO2 mmol/L 23.2 20.2*   BUN mg/dL 10 8   CREATININE mg/dL 0.71* 0.79   GLUCOSE mg/dL 102* 86   Estimated Creatinine Clearance: 95.1 mL/min (by C-G formula based on Cr of 0.71).  Results from last 7 days  Lab Units 17  0645 17  1437   CALCIUM mg/dL 7.8* 8.2*   ALBUMIN g/dL  --  3.80   MAGNESIUM mg/dL 2.7*  --          vitamin B-1 100 mg Oral Daily   And      multivitamin 1 tablet Oral Daily   And      folic acid 1 mg Oral Daily   IV  Fluids 1000 mL + additives 100 mL/hr Intravenous Daily   nicotine 1 patch Transdermal Nightly       pantoprazole 8 mg/hr Last Rate: 8 mg/hr (08/24/17 0827)   [START ON 8/26/2017] sodium chloride 100 mL/hr Last Rate: 100 mL/hr (08/24/17 0841)   NPO Diet Ice Chips, Sips With Meds      Assessment/Plan   Assessment:     Active Hospital Problems (** Indicates Principal Problem)    Diagnosis Date Noted   • **Gastrointestinal hemorrhage [K92.2] 08/23/2017   • Melena [K92.1] 08/23/2017   • Alcohol abuse [F10.10] 08/23/2017   • PUD (peptic ulcer disease) [K27.9] 08/23/2017   • Generalized abdominal pain [R10.84] 08/23/2017   • Chronic low back pain [M54.5, G89.29] 08/23/2017      Resolved Hospital Problems    Diagnosis Date Noted Date Resolved   No resolved problems to display.       Plan:   GI bleed  -with melena likely upper from gastritis (NSAID use, EtOH) or PUD (has hx of duodenal ulcer requiring partial small bowel resection in 2012)  -continue protonix gtt, trend H&H  -Dr. Carey consulted, planning EGD today    EtOH abuse  -states he drinks 2-3 beers daily, sometimes more  -on CIWA protocol with vitamin replacement  -no evidence of withdrawal    DVT prophylaxis  -TEDs/SCDs due to acute hemorrhage    Disposition  TBD.      Bubba Whaley MD  [facility]  08/24/17  12:43 PM

## 2017-08-24 NOTE — PROGRESS NOTES
"Adult Nutrition  Assessment/PES    Patient Name:  Marcello Nguyen  YOB: 1965  MRN: [id number]  Admit Date:  8/23/2017    Assessment Date:  8/24/2017        Reason for Assessment       08/24/17 1417    Reason for Assessment    Reason For Assessment/Visit identified at risk by screening criteria    Identified At Risk By Screening Criteria BMI    Gastrointestinal PUD;Bowel perforation;Gastrointestinal bleed;Partial gastrectomy    Substance Use ETOH;Other (comment)   Reports 3 beers during week, 8-10 weekends; regular use of NSAIDs    Other diagnosis One month hx black, tarry stools. Previous hx PUD & jejunal perforation with repair in 2012. Liver bx at that time -> steatosis.                 Anthropometrics       08/24/17 1419    Anthropometrics    RD Documented Weight on Admission 56.5 kg (124 lb 9 oz)    Anthropometrics (Special Considerations)    Height Used for Calculations 1.727 m (5' 8\")    RD Calculated     RD Calculated % IBW 80    RD Calculated BMI (kg/m2) 18.9    Usual Body Weight (UBW)    Usual Body Weight --   no wt hx on file in Chart Review; pt off unit at present - noted that he denied any weight change upon presentation to ER    Body Mass Index (BMI)    BMI Grade 17 - 19 low grade I            Labs/Tests/Procedures/Meds       08/24/17 1420    Labs/Tests/Procedures/Meds    Diagnostic Test/Procedure Review reviewed, pertinent   Curently in ENDO for UGI     Labs/Tests Review Reviewed;Mg++;Hgb Hct    Medication Review Reviewed, pertinent;Multivitamin;Other (comment)   carafate & thiamine/folic acid    Significant Vitals reviewed            Physical Findings       08/24/17 1422    Physical Findings/Assessment    Additional Documentation --   off unit; nursing does not note any skin issues            Estimated/Assessed Needs       08/24/17 1422    Calculation Measurements    Weight Used For Calculations 56.2 kg (124 lb)    Estimated/Assessed Energy Needs    Energy Need Method " Kcal/kg    kcal/kg 35    35 Kcal/Kg (kcal) 1968.61    Estimated/Assessed Protein Needs    Weight Used for Protein Calculation 56.2 kg (124 lb)    Protein (gm/kg) 1.2    1.2 Gm Protein (gm) 67.5    Estimated/Assessed Fluid Needs    Fluid Need Method RDA method    RDA Method (mL)  1968            Nutrition Prescription Ordered       08/24/17 1424    Nutrition Prescription PO    Current PO Diet NPO;Sips/ice chips                Problem/Interventions:        Problem 1       08/24/17 1424    Nutrition Diagnoses Problem 1    Problem 1 Altered GI Function    Etiology (related to) Medical Diagnosis    Gastrointestinal PUD;Gastrointestinal bleed            Problem 2       08/24/17 1424    Nutrition Diagnoses Problem 2    Problem 2 Impaired Nutrient Utilization    Etiology (related to) Medical Diagnosis    Substance Use ETOH    Signs/Symptoms (evidenced by) Report/Observation                  Intervention Goal       08/24/17 1425    Intervention Goal    General Maintain nutrition;Reduce/improve symptoms;Disease management/therapy    PO Establish PO    Weight Appropriate weight gain            Nutrition Intervention       08/24/17 1425    Nutrition Intervention    RD/Tech Action Await begin PO;Follow Tx progress;Care plan reviewd;Other (comment)   on appropriate micronutrient supplementation for ETOH abuse              Education/Evaluation       08/24/17 1425    Monitor/Evaluation    Monitor Per protocol          Comments:  Will continue to follow, monitor PO intakes & wt status.     Electronically signed by:  Trish Etienne RD  08/24/17 2:26 PM

## 2017-08-24 NOTE — DISCHARGE SUMMARY
Date of Admission: 8/23/2017  Date of Discharge:  8/24/2017  Primary Care Physician: Fer Martin MD     Discharge Diagnosis:  Active Hospital Problems (** Indicates Principal Problem)    Diagnosis Date Noted   • **Gastrointestinal hemorrhage [K92.2] 08/23/2017   • Alcohol abuse [F10.10] 08/23/2017   • PUD (peptic ulcer disease) [K27.9] 08/23/2017   • Chronic low back pain [M54.5, G89.29] 08/23/2017      Resolved Hospital Problems    Diagnosis Date Noted Date Resolved   • Melena [K92.1] 08/23/2017 08/24/2017   • Generalized abdominal pain [R10.84] 08/23/2017 08/24/2017       Presenting Problem/History of Present Illness:  Small bowel perforation [K63.1]  Alcohol dependence with intoxication with complication [F10.229]  Gastrointestinal hemorrhage, unspecified gastrointestinal hemorrhage type [K92.2]  Gastrointestinal hemorrhage, unspecified gastrointestinal hemorrhage type [K92.2]  Gastrointestinal hemorrhage, unspecified gastrointestinal hemorrhage type [K92.2]     Hospital Course:  The patient is a 52 y.o. male who presented with several weeks of melanotic stools. Please see H&P from 8/23/17 for further details.  On admission, he was started on a protonix drip and his hemoglobin was followed.  This remained stable at about 12 and he was hemodynamically stable.  Dr. Carey with gastroenterology was consulted and he was taken for EGD on 8/24 per Dr. Baldwin, which showed some possible Tobar's esophagus and possible esophageal varices, which is why this was not biopsied.  He did have a duodenal ulcer without stigmata of bleeding.  He was started on PO protonix and carafate per Dr. Baldwin's recommendations.  He is to avoid NSAIDs and EtOH, which were thought to have been the cause of this.  He is to follow up with his PCP in about a week to address his chronic back pain, as an alternative for the NSAIDs will be needed-it is recommended that he use tylenol for the time being.  He is to follow up with Dr. Carey with  "gastroenterology in a few months to assess healing of the esophagus and duodenal ulcer.    Following the endoscopy the patient requested to be discharged.  This was discussed with Dr. Baldwin and this is reasonable given his stable Hgb and hemodynamics.  He is a regular alcohol user and had no evidence of withdrawals during this hospitalization.      Exam Today:  Blood pressure 111/81, pulse 72, temperature 98.3 °F (36.8 °C), resp. rate 14, height 68\" (172.7 cm), weight 124 lb 9.6 oz (56.5 kg), SpO2 96 %.  General: alert, no distress  Head: NCAT  Eyes: EOMI, conjunctivae normal  Mouth/Throat: Oropharynx clear and moist  Neck: supple, no JVD  Cardiac: Normal rate, regular rhythm  Respiratory: Normal effort, clear to ascultation  Abdomen: soft, mild epigastric tenderness without guarding or rebound, bowel sounds are normoactive  Musculoskeletal: no deformity, no tenderness  Extremities: well-perfused, no cyanosis, no edema  Neuro: oriented x3, no gross deficits  Psych: mood and affect appropriate    Procedures Performed:  Procedure(s):  ESOPHAGOGASTRODUODENOSCOPY WITH COLD BIOPSIES  08/24 1328 UPPER GI ENDOSCOPY              Consults:   Consults     Date and Time Order Name Status Description    8/23/2017 1826 Inpatient Consult to Gastroenterology Completed     8/23/2017 1646 [facility] (on-call MD unless specified) Completed            Discharge Disposition:  Home or Self Care    Discharge Medications:   Marcello Nguyen   Home Medication Instructions DENILSON:[id number]    Printed on:08/24/17 1650   Medication Information                      acetaminophen (TYLENOL) 325 MG tablet  Take 2 tablets by mouth Every 6 (Six) Hours As Needed for Mild Pain .             nicotine (NICODERM CQ) 21 MG/24HR patch  Place 1 patch on the skin Every Night.             pantoprazole (PROTONIX) 40 MG EC tablet  Take 1 tablet by mouth Daily.             sucralfate (CARAFATE) 1 GM/10ML suspension  Take 10 mL by mouth 4 (Four) Times a Day With " Meals & at Bedtime for 14 days.                 Discharge Diet:   Diet Instructions     Diet: Regular; Thin       Discharge Diet:  Regular   Fluid Consistency:  Thin                 Activity at Discharge:   Activity Instructions     Activity as Tolerated                     Follow-up Appointments:  No future appointments.  Additional Instructions for the Follow-ups that You Need to Schedule     Discharge Follow-Up With Specified Provider    As directed    To:  Dr. Carey   Follow Up:  3 Months       Discharge Follow-up with PCP    As directed    Follow Up Details:  1-2 weeks                 Test Results Pending at Discharge:   Order Current Status    Tissue Pathology Exam In process    Urease For H Pylori In process           Bubba Whaley MD  08/24/17  4:50 PM    Time Spent on Discharge Activities: Greater than 30 minutes.

## 2017-08-24 NOTE — CONSULTS
[facility]  Initial Inpatient Consult Note    Referring Provider: [facility]    Reason for Consultation: Melena    Subjective     History of present illness:    52 y.o. male white male with a history of peptic ulcer disease who presented with 1 month of intermittent melena with associated abdominal pain.  He has a prior history of a perforated jejunal ulcer in 2012.  He was evaluated by Dr. Claudia Rivera at that time.  He underwent a small bowel resection at that time.  He also underwent a Alexandro-Cut liver biopsy at that time due to a history of alcohol abuse which showed severe steatosis and some focal steatohepatitis.  He also had some periportal and portal fibrosis at that time.    He reports that his symptoms resumed about a month ago.  He reports he has been taking more frequent ibuprofen due to back pain.  He does not recall taking a lot of NSAIDs at the time of his previous ulcers.  He also reports having a surgical intervention for ulceration in [place] at one point.  He has been seeing intermittently black tarry stool.  He has pain in his upper abdomen.  He has no nausea and he hasn't vomited.  His appetite has not changed and he has not lost any weight.  He denies any dizziness with standing, chest pain or shortness of air with exertion.    He continues to drink alcohol and has a history of heavy alcohol use.  He reports that he drinks 3 beers on work days and 8-10 beers on weekends.  He has tried to quit drinking in the past and he has gotten the shakes but he has never had a seizure from alcohol withdrawal.    His last endoscopic evaluation of record was in 2013 performed by Dr. Claudia Rivera.  He had some mucosal irregularity in the duodenum on EGD.  He had a 6 mm tubular adenoma in his colon.  He denies any family history of GI disease.    Past Medical History:  Past Medical History:   Diagnosis Date   • Back pain    • History of stomach ulcers      Past Surgical History:  Past  Surgical History:   Procedure Laterality Date   • COLON SURGERY        Social History:   Social History   Substance Use Topics   • Smoking status: Current Every Day Smoker     Packs/day: 1.00   • Smokeless tobacco: Not on file   • Alcohol use Yes      Comment: 2-3 beers a day      Family History:  Family History   Problem Relation Age of Onset   • Cancer Father        Home Meds:  Prescriptions Prior to Admission   Medication Sig Dispense Refill Last Dose   • omeprazole (priLOSEC) 20 MG capsule Take 20 mg by mouth Daily.      • naproxen sodium (ALEVE) 220 MG tablet Take 220 mg by mouth 2 (Two) Times a Day As Needed (pt reports he has been taking aleve 1-2 tablets every 4 hrs).   Taking Differently     Current Meds:     vitamin B-1 100 mg Oral Daily   And      multivitamin 1 tablet Oral Daily   And      folic acid 1 mg Oral Daily   IV Fluids 1000 mL + additives 100 mL/hr Intravenous Daily   nicotine 1 patch Transdermal Nightly     Allergies:  No Known Allergies  Review of Systems  Pertinent items are noted in HPI, all other systems reviewed and negative     Objective     Vital Signs  Temp:  [96.9 °F (36.1 °C)-98.7 °F (37.1 °C)] 96.9 °F (36.1 °C)  Heart Rate:  [69-93] 71  Resp:  [16-18] 16  BP: ()/(58-74) 116/74  Physical Exam:  General Appearance:    Alert, cooperative, in no acute distress   Head:    Normocephalic, without obvious abnormality, atraumatic   Eyes:            Lids and lashes normal, conjunctivae and sclerae normal, no   icterus   Throat:   No oral lesions, no thrush, oral mucosa Dry        Lungs:     Clear to auscultation,respirations regular, even and                   unlabored    Heart:    Regular rhythm and normal rate, normal S1 and S2, no            murmur   Chest Wall:    No abnormalities observed   Abdomen:   Soft, nondistended.  Tender in the midepigastrium without rebound or guarding.  Normal bowel sounds.  Large midline scar from previous laparotomy   Rectal:     Deferred   Extremities:    no edema    Skin:   No bleeding, bruising or rash       Psychiatric:  Judgement and insight: normal   Orientation to person place and time: normal   Mood and affect: normal   Results Review:   I reviewed the patient's new clinical results.      Results from last 7 days  Lab Units 08/24/17  0645 08/24/17  0013 08/23/17  1437   WBC 10*3/mm3 7.70  --  7.81   HEMOGLOBIN g/dL 12.4* 11.9* 12.4*   HEMATOCRIT % 36.3* 34.7* 37.0*   PLATELETS 10*3/mm3 234  --  247       Results from last 7 days  Lab Units 08/24/17  0645 08/23/17  1437   SODIUM mmol/L 140 141   POTASSIUM mmol/L 4.5 4.2   CHLORIDE mmol/L 105 105   CO2 mmol/L 23.2 20.2*   BUN mg/dL 10 8   CREATININE mg/dL 0.71* 0.79   CALCIUM mg/dL 7.8* 8.2*   BILIRUBIN mg/dL  --  <0.2   ALK PHOS U/L  --  44   ALT (SGPT) U/L  --  15   AST (SGOT) U/L  --  27   GLUCOSE mg/dL 102* 86       Results from last 7 days  Lab Units 08/24/17  0645 08/23/17  1437   INR  1.02 0.94     No results found for: LIPASE    Radiology:  CT Abdomen Pelvis Without Contrast   Final Result   1. Previous partial gastrectomy and gastrojejunostomy. No evidence for   acute intra-abdominal process on exam limited without IV contrast.   2. Urinary bladder distention.       This report was finalized on 8/23/2017 4:46 PM by Dr. Eric Atwood MD.              Assessment/Plan   Patient Active Problem List   Diagnosis   • Gastrointestinal hemorrhage   • Melena   • Alcohol abuse   • PUD (peptic ulcer disease)   • Generalized abdominal pain   • Chronic low back pain     Impression-  1.  Melena in the setting of heavy NSAID use  2.  History of previous peptic ulcer disease and perforated jejunal ulcer requiring small bowel resection in 2012  3.  Alcohol abuse  4.  Tobacco abuse    Plan-  He is hemodynamically stable.  We will plan on an EGD for further evaluation of his complaints today.  In the interim continue pantoprazole drip.  His hemoglobin is mildly low but not worthy of transfusion.  Continue to follow  his H&H.  I have counseled him on avoiding NSAIDs in the future and decreasing his alcohol consumption considerably.  While he did have some fibrosis on his previous liver biopsy in 2012, CT and labs do not show signs of obvious portal hypertension.    I discussed the patients findings and my recommendations with patient and nursing staff.    Aida Carey MD

## 2017-08-25 LAB
LAB AP CASE REPORT: NORMAL
Lab: NORMAL
PATH REPORT.FINAL DX SPEC: NORMAL
PATH REPORT.GROSS SPEC: NORMAL
UREASE TISS QL: NEGATIVE

## 2017-08-25 NOTE — PROGRESS NOTES
Discharge Planning Assessment  [facility]     Patient Name: Marcello Nguyen  MRN: [id number]  Today's Date: 8/25/2017    Admit Date: 8/23/2017          Discharge Needs Assessment     None            Discharge Plan       08/25/17 1428    Final Note    Final Note Discharged to home on 8/24/17. DEB Valenzuela, Rn, CCP.         Discharge Placement     No information found        Expected Discharge Date and Time     Expected Discharge Date Expected Discharge Time    Aug 24, 2017  7:06 PM              Demographic Summary     None            Functional Status     None            Psychosocial     None            Abuse/Neglect     None            Legal     None            Substance Abuse     None            Patient Forms     None          Hina Valenzuela, RN

## 2017-09-14 ENCOUNTER — HOSPITAL ENCOUNTER (EMERGENCY)
Facility: HOSPITAL | Age: 52
Discharge: HOME | End: 2017-09-15
Attending: EMERGENCY MEDICINE | Admitting: EMERGENCY MEDICINE

## 2017-09-14 DIAGNOSIS — K29.20 ACUTE ALCOHOLIC GASTRITIS WITHOUT HEMORRHAGE: ICD-10-CM

## 2017-09-14 DIAGNOSIS — F10.920 ACUTE ALCOHOL INTOXICATION, UNCOMPLICATED (HCC): ICD-10-CM

## 2017-09-14 DIAGNOSIS — K27.9 PUD (PEPTIC ULCER DISEASE): Primary | ICD-10-CM

## 2017-09-14 DIAGNOSIS — F10.10 ALCOHOL ABUSE: ICD-10-CM

## 2017-09-14 PROCEDURE — 99284 EMERGENCY DEPT VISIT MOD MDM: CPT

## 2017-09-14 RX ORDER — ONDANSETRON 2 MG/ML
4 INJECTION INTRAMUSCULAR; INTRAVENOUS ONCE
Status: COMPLETED | OUTPATIENT
Start: 2017-09-14 | End: 2017-09-15

## 2017-09-14 RX ORDER — FAMOTIDINE 10 MG/ML
20 INJECTION, SOLUTION INTRAVENOUS ONCE
Status: COMPLETED | OUTPATIENT
Start: 2017-09-14 | End: 2017-09-15

## 2017-09-14 RX ORDER — SODIUM CHLORIDE 0.9 % (FLUSH) 0.9 %
10 SYRINGE (ML) INJECTION AS NEEDED
Status: DISCONTINUED | OUTPATIENT
Start: 2017-09-14 | End: 2017-09-15 | Stop reason: HOSPADM

## 2017-09-15 VITALS
TEMPERATURE: 98.3 F | DIASTOLIC BLOOD PRESSURE: 75 MMHG | WEIGHT: 130 LBS | BODY MASS INDEX: 18.61 KG/M2 | RESPIRATION RATE: 16 BRPM | OXYGEN SATURATION: 100 % | HEIGHT: 70 IN | HEART RATE: 87 BPM | SYSTOLIC BLOOD PRESSURE: 106 MMHG

## 2017-09-15 LAB
ALBUMIN SERPL-MCNC: 3.5 G/DL (ref 3.5–5.2)
ALBUMIN/GLOB SERPL: 1.1 G/DL
ALP SERPL-CCNC: 60 U/L (ref 39–117)
ALT SERPL W P-5'-P-CCNC: 18 U/L (ref 1–41)
ANION GAP SERPL CALCULATED.3IONS-SCNC: 15.7 MMOL/L
AST SERPL-CCNC: 45 U/L (ref 1–40)
BASOPHILS # BLD AUTO: 0.16 10*3/MM3 (ref 0–0.2)
BASOPHILS NFR BLD AUTO: 2.5 % (ref 0–1.5)
BILIRUB SERPL-MCNC: 0.2 MG/DL (ref 0.1–1.2)
BILIRUB UR QL STRIP: NEGATIVE
BUN BLD-MCNC: 5 MG/DL (ref 6–20)
BUN/CREAT SERPL: 7.7 (ref 7–25)
CALCIUM SPEC-SCNC: 8.4 MG/DL (ref 8.6–10.5)
CHLORIDE SERPL-SCNC: 104 MMOL/L (ref 98–107)
CLARITY UR: CLEAR
CO2 SERPL-SCNC: 24.3 MMOL/L (ref 22–29)
COLOR UR: YELLOW
CREAT BLD-MCNC: 0.65 MG/DL (ref 0.76–1.27)
DEPRECATED RDW RBC AUTO: 45.3 FL (ref 37–54)
EOSINOPHIL # BLD AUTO: 0.32 10*3/MM3 (ref 0–0.7)
EOSINOPHIL NFR BLD AUTO: 5.1 % (ref 0.3–6.2)
ERYTHROCYTE [DISTWIDTH] IN BLOOD BY AUTOMATED COUNT: 12.5 % (ref 11.5–14.5)
ETHANOL BLD-MCNC: 349 MG/DL (ref 0–10)
ETHANOL UR QL: 0.35 %
GFR SERPL CREATININE-BSD FRML MDRD: 128 ML/MIN/1.73
GLOBULIN UR ELPH-MCNC: 3.1 GM/DL
GLUCOSE BLD-MCNC: 76 MG/DL (ref 65–99)
GLUCOSE UR STRIP-MCNC: NEGATIVE MG/DL
HCT VFR BLD AUTO: 38.6 % (ref 40.4–52.2)
HGB BLD-MCNC: 13.3 G/DL (ref 13.7–17.6)
HGB UR QL STRIP.AUTO: NEGATIVE
IMM GRANULOCYTES # BLD: 0 10*3/MM3 (ref 0–0.03)
IMM GRANULOCYTES NFR BLD: 0 % (ref 0–0.5)
KETONES UR QL STRIP: NEGATIVE
LEUKOCYTE ESTERASE UR QL STRIP.AUTO: NEGATIVE
LIPASE SERPL-CCNC: 46 U/L (ref 13–60)
LYMPHOCYTES # BLD AUTO: 3.32 10*3/MM3 (ref 0.9–4.8)
LYMPHOCYTES NFR BLD AUTO: 52.5 % (ref 19.6–45.3)
MAGNESIUM SERPL-MCNC: 2.2 MG/DL (ref 1.6–2.6)
MCH RBC QN AUTO: 34.5 PG (ref 27–32.7)
MCHC RBC AUTO-ENTMCNC: 34.5 G/DL (ref 32.6–36.4)
MCV RBC AUTO: 100 FL (ref 79.8–96.2)
MONOCYTES # BLD AUTO: 0.52 10*3/MM3 (ref 0.2–1.2)
MONOCYTES NFR BLD AUTO: 8.2 % (ref 5–12)
NEUTROPHILS # BLD AUTO: 2 10*3/MM3 (ref 1.9–8.1)
NEUTROPHILS NFR BLD AUTO: 31.7 % (ref 42.7–76)
NITRITE UR QL STRIP: NEGATIVE
PH UR STRIP.AUTO: 5.5 [PH] (ref 5–8)
PLATELET # BLD AUTO: 319 10*3/MM3 (ref 140–500)
PMV BLD AUTO: 9.6 FL (ref 6–12)
POTASSIUM BLD-SCNC: 3.7 MMOL/L (ref 3.5–5.2)
PROT SERPL-MCNC: 6.6 G/DL (ref 6–8.5)
PROT UR QL STRIP: NEGATIVE
RBC # BLD AUTO: 3.86 10*6/MM3 (ref 4.6–6)
SODIUM BLD-SCNC: 144 MMOL/L (ref 136–145)
SP GR UR STRIP: 1.01 (ref 1–1.03)
UROBILINOGEN UR QL STRIP: NORMAL
WBC NRBC COR # BLD: 6.32 10*3/MM3 (ref 4.5–10.7)

## 2017-09-15 PROCEDURE — 96375 TX/PRO/DX INJ NEW DRUG ADDON: CPT

## 2017-09-15 PROCEDURE — 25010000002 THIAMINE PER 100 MG: Performed by: PHYSICIAN ASSISTANT

## 2017-09-15 PROCEDURE — 81003 URINALYSIS AUTO W/O SCOPE: CPT | Performed by: PHYSICIAN ASSISTANT

## 2017-09-15 PROCEDURE — 80307 DRUG TEST PRSMV CHEM ANLYZR: CPT | Performed by: PHYSICIAN ASSISTANT

## 2017-09-15 PROCEDURE — 85025 COMPLETE CBC W/AUTO DIFF WBC: CPT | Performed by: PHYSICIAN ASSISTANT

## 2017-09-15 PROCEDURE — 99284 EMERGENCY DEPT VISIT MOD MDM: CPT

## 2017-09-15 PROCEDURE — 25010000002 ONDANSETRON PER 1 MG: Performed by: PHYSICIAN ASSISTANT

## 2017-09-15 PROCEDURE — 96365 THER/PROPH/DIAG IV INF INIT: CPT

## 2017-09-15 PROCEDURE — 83690 ASSAY OF LIPASE: CPT | Performed by: PHYSICIAN ASSISTANT

## 2017-09-15 PROCEDURE — 80053 COMPREHEN METABOLIC PANEL: CPT | Performed by: PHYSICIAN ASSISTANT

## 2017-09-15 PROCEDURE — 83735 ASSAY OF MAGNESIUM: CPT | Performed by: PHYSICIAN ASSISTANT

## 2017-09-15 PROCEDURE — 25010000002 MAGNESIUM SULFATE PER 500 MG OF MAGNESIUM: Performed by: PHYSICIAN ASSISTANT

## 2017-09-15 RX ORDER — SUCRALFATE ORAL 1 G/10ML
1 SUSPENSION ORAL 3 TIMES DAILY
Qty: 900 ML | Refills: 0 | Status: SHIPPED | OUTPATIENT
Start: 2017-09-15 | End: 2017-10-15

## 2017-09-15 RX ORDER — PANTOPRAZOLE SODIUM 40 MG/1
40 TABLET, DELAYED RELEASE ORAL DAILY
Qty: 30 TABLET | Refills: 0 | Status: SHIPPED | OUTPATIENT
Start: 2017-09-15

## 2017-09-15 RX ADMIN — SODIUM CHLORIDE 1000 ML: 9 INJECTION, SOLUTION INTRAVENOUS at 04:42

## 2017-09-15 RX ADMIN — ONDANSETRON 4 MG: 2 INJECTION INTRAMUSCULAR; INTRAVENOUS at 00:36

## 2017-09-15 RX ADMIN — FAMOTIDINE 20 MG: 10 INJECTION, SOLUTION INTRAVENOUS at 00:40

## 2017-09-15 RX ADMIN — FOLIC ACID 1000 ML/HR: 5 INJECTION, SOLUTION INTRAMUSCULAR; INTRAVENOUS; SUBCUTANEOUS at 00:43

## 2017-09-15 NOTE — ED NOTES
Md consent for pt to leave ED without ride present. Pt given bus token by CCP RN.      Erika Huddleston RN  09/15/17 4131

## 2017-09-15 NOTE — DISCHARGE INSTRUCTIONS
Home, rest, medicine as prescribed, STOP DRINKING ALCOHOL AS THIS IS THE CAUSE OF YOUR ABDOMINAL COMPLAINTS, follow up with surgeon and PCP for recheck and further evaluation. Return to care with further concerns.

## 2017-09-15 NOTE — ED NOTES
Pt resting quietly.  Awakens easily.  VSS.  No acute distress noted.     Sho Thomson RN  09/15/17 0537

## 2017-09-15 NOTE — ED PROVIDER NOTES
0700 Pt's care turned over pending sobriety    0720 Informed pt of plan to discharge if he can find a safe ride home. Pt states he is currently attempting to contact family and friends for transportation. I advised pt to follow up with Dr. Rivera for his abd hernia.     On exam:  Neuro: Alert and oriented x3, NAD  Abd: Reducible abd hernia that is not incarcerated    1050 The pt is tolerating food, has clear speech, and has normal gait. Will discharge. Pt understands and agrees with the plan. All questions answered.    DISCHARGE    Patient discharged in stable condition.    Reviewed implications of results, diagnosis, meds, responsibility to follow up, warning signs and symptoms of possible worsening, potential complications and reasons to return to ER, including new or worsening symptoms.    Patient/Family voiced understanding of above instructions.    Discussed plan for discharge, as there is no emergent indication for admission.  Pt/family is agreeable and understands need for follow up and repeat testing.  Pt is aware that discharge does not mean that nothing is wrong but it indicates no emergency is present that requires admission and they must continue care with follow-up as given below or physician of their choice.     FOLLOW-UP  Claudia Rivera MD  [address] [facility] [address]  [place] [id number] [phone]    Schedule an appointment as soon as possible for a visit      Fer Martin MD  [address] [facility] [address]  [place] [address]  [phone]    Schedule an appointment as soon as possible for a visit in 1 week           Medication List      New Prescriptions          sucralfate 1 GM/10ML suspension   Commonly known as:  CARAFATE   Take 10 mL by mouth 3 (Three) Times a Day for 30 days.               Documentation assistance provided by melinda Zafar for Dr. Hook.  Information recorded by the melinda was done at my direction and has been verified and validated by me.           Cuca  Andrade  09/15/17 5252       Eric Hook MD  09/15/17 2723

## 2017-09-15 NOTE — ED PROVIDER NOTES
" EMERGENCY DEPARTMENT ENCOUNTER    CHIEF COMPLAINT  Chief Complaint: Abdominal Pain  History given by: Patient  History limited by: Patient appears intoxicated   Room Number: 05/05  PMD: Fer Martin MD  General surgeon: Dr. Rivera            HPI:  Pt reports that he was admitted in 08/2017 secondary to GI bleed, ETOH abuse, and peptic ulcer disease. Pt presents to the ED c/o intermittent episodes of \"sharp\" generalized abdominal pain onset several days ago. Pt states that he has taken tylenol and \"some sort of stomach medicine\" without significant sx relief. Pt has also had nausea and diarrhea, but denies bloody stools, vomiting, hematemesis, chest pain, dyspnea, and dysuria. Pt reports that he has a known left ventral hernia. There are no other complaints at this time.     Pain Location: Generalized abdomen  Radiation: None  Quality: \"sharp\"  Intensity/Severity: Moderate  Duration: Onset several days ago  Onset quality: Gradual  Timing: Intermittent  Progression: Waxing and waning  Aggravating Factors: Nothing  Alleviating Factors: Nothing  Previous Episodes: Multiple  Treatment before arrival: Tylenol (no significant sx relief); \"some sort of stomach medicine\" (no significant sx relief)  Associated Symptoms: Nausea, diarrhea      PAST MEDICAL HISTORY  Active Ambulatory Problems     Diagnosis Date Noted   • Gastrointestinal hemorrhage 08/23/2017   • Alcohol abuse 08/23/2017   • PUD (peptic ulcer disease) 08/23/2017   • Chronic low back pain 08/23/2017     Resolved Ambulatory Problems     Diagnosis Date Noted   • Melena 08/23/2017   • Generalized abdominal pain 08/23/2017     Past Medical History:   Diagnosis Date   • Back pain    • History of stomach ulcers          PAST SURGICAL HISTORY  Past Surgical History:   Procedure Laterality Date   • COLON SURGERY  2012   • ENDOSCOPY N/A 8/24/2017    Procedure: ESOPHAGOGASTRODUODENOSCOPY WITH COLD BIOPSIES;  Surgeon: Bert Baldwin MD;  Location: [facility] ENDOSCOPY;  " Service:          FAMILY HISTORY  Family History   Problem Relation Age of Onset   • Cancer Father          SOCIAL HISTORY  Social History     Social History   • Marital status: Single     Spouse name: N/A   • Number of children: N/A   • Years of education: N/A     Occupational History   • Not on file.     Social History Main Topics   • Smoking status: Current Every Day Smoker     Packs/day: 1.00   • Smokeless tobacco: Not on file   • Alcohol use Yes      Comment: 2-3 beers a day   • Drug use: Yes     Special: Marijuana   • Sexual activity: Defer     Other Topics Concern   • Not on file     Social History Narrative         ALLERGIES  Review of patient's allergies indicates no known allergies.        REVIEW OF SYSTEMS  Review of Systems   Unable to perform ROS: Other (pt appears intoxicated)   Respiratory: Negative for shortness of breath.    Cardiovascular: Negative for chest pain.   Gastrointestinal: Positive for abdominal pain, diarrhea and nausea. Negative for blood in stool and vomiting.   Genitourinary: Negative for dysuria.            PHYSICAL EXAM  ED Triage Vitals   Temp Heart Rate Resp BP SpO2   09/14/17 2224 09/14/17 2220 09/14/17 2220 09/14/17 2220 09/14/17 2220   98.3 °F (36.8 °C) 84 16 137/83 97 % WNL      Temp src Heart Rate Source Patient Position BP Location FiO2 (%)   -- -- -- -- --              Physical Exam   Constitutional: He is oriented to person, place, and time. No distress.   Pt appears intoxicated and smells of ETOH.   HENT:   Head: Normocephalic and atraumatic.   Mouth/Throat: Mucous membranes are normal.   Eyes: EOM are normal. Pupils are equal, round, and reactive to light.   Neck: Normal range of motion. Neck supple.   Cardiovascular: Normal rate, regular rhythm and normal heart sounds.    Pulmonary/Chest: Effort normal and breath sounds normal. No respiratory distress. He has no decreased breath sounds. He has no wheezes. He has no rhonchi. He has no rales.   Abdominal: Soft. Bowel  sounds are normal. There is no tenderness. There is no rebound and no guarding. A hernia is present. Hernia confirmed positive in the ventral area (left ventral hernia that is easily reducible).   Scars from previous surgery present.    Musculoskeletal: Normal range of motion.   Neurological: He is alert and oriented to person, place, and time. He has normal sensation.   Skin: Skin is warm and dry.   Psychiatric: Mood and affect normal.   Nursing note and vitals reviewed.          LAB RESULTS  Recent Results (from the past 24 hour(s))   Comprehensive Metabolic Panel    Collection Time: 09/15/17 12:42 AM   Result Value Ref Range    Glucose 76 65 - 99 mg/dL    BUN 5 (L) 6 - 20 mg/dL    Creatinine 0.65 (L) 0.76 - 1.27 mg/dL    Sodium 144 136 - 145 mmol/L    Potassium 3.7 3.5 - 5.2 mmol/L    Chloride 104 98 - 107 mmol/L    CO2 24.3 22.0 - 29.0 mmol/L    Calcium 8.4 (L) 8.6 - 10.5 mg/dL    Total Protein 6.6 6.0 - 8.5 g/dL    Albumin 3.50 3.50 - 5.20 g/dL    ALT (SGPT) 18 1 - 41 U/L    AST (SGOT) 45 (H) 1 - 40 U/L    Alkaline Phosphatase 60 39 - 117 U/L    Total Bilirubin 0.2 0.1 - 1.2 mg/dL    eGFR Non African Amer 128 >60 mL/min/1.73    Globulin 3.1 gm/dL    A/G Ratio 1.1 g/dL    BUN/Creatinine Ratio 7.7 7.0 - 25.0    Anion Gap 15.7 mmol/L   Lipase    Collection Time: 09/15/17 12:42 AM   Result Value Ref Range    Lipase 46 13 - 60 U/L   Ethanol    Collection Time: 09/15/17 12:42 AM   Result Value Ref Range    Ethanol 349 (C) 0 - 10 mg/dL    Ethanol % 0.349 %   Magnesium    Collection Time: 09/15/17 12:42 AM   Result Value Ref Range    Magnesium 2.2 1.6 - 2.6 mg/dL   CBC Auto Differential    Collection Time: 09/15/17 12:42 AM   Result Value Ref Range    WBC 6.32 4.50 - 10.70 10*3/mm3    RBC 3.86 (L) 4.60 - 6.00 10*6/mm3    Hemoglobin 13.3 (L) 13.7 - 17.6 g/dL    Hematocrit 38.6 (L) 40.4 - 52.2 %    .0 (H) 79.8 - 96.2 fL    MCH 34.5 (H) 27.0 - 32.7 pg    MCHC 34.5 32.6 - 36.4 g/dL    RDW 12.5 11.5 - 14.5 %     RDW-SD 45.3 37.0 - 54.0 fl    MPV 9.6 6.0 - 12.0 fL    Platelets 319 140 - 500 10*3/mm3    Neutrophil % 31.7 (L) 42.7 - 76.0 %    Lymphocyte % 52.5 (H) 19.6 - 45.3 %    Monocyte % 8.2 5.0 - 12.0 %    Eosinophil % 5.1 0.3 - 6.2 %    Basophil % 2.5 (H) 0.0 - 1.5 %    Immature Grans % 0.0 0.0 - 0.5 %    Neutrophils, Absolute 2.00 1.90 - 8.10 10*3/mm3    Lymphocytes, Absolute 3.32 0.90 - 4.80 10*3/mm3    Monocytes, Absolute 0.52 0.20 - 1.20 10*3/mm3    Eosinophils, Absolute 0.32 0.00 - 0.70 10*3/mm3    Basophils, Absolute 0.16 0.00 - 0.20 10*3/mm3    Immature Grans, Absolute 0.00 0.00 - 0.03 10*3/mm3   Urinalysis With / Culture If Indicated    Collection Time: 09/15/17 12:43 AM   Result Value Ref Range    Color, UA Yellow Yellow, Straw    Appearance, UA Clear Clear    pH, UA 5.5 5.0 - 8.0    Specific Gravity, UA 1.009 1.005 - 1.030    Glucose, UA Negative Negative    Ketones, UA Negative Negative    Bilirubin, UA Negative Negative    Blood, UA Negative Negative    Protein, UA Negative Negative    Leuk Esterase, UA Negative Negative    Nitrite, UA Negative Negative    Urobilinogen, UA 0.2 E.U./dL 0.2 - 1.0 E.U./dL       Ordered the above labs and reviewed the results.              PROCEDURES  Procedures              PROGRESS AND CONSULTS  ED Course     11:52 PM:  Blood work, UA, and BAL ordered for further evaluation. Pepcid and zofran ordered to treat for abd pain and nausea. Banana bag ordered also.     3:24 AM:  Pt's BAL is 349. Pt's care was turned over to Dr. Panda. Pt will be discharged; however, pt's discharge is pending pt's sobriety or ride. Pt will be prescribed with rx for Carafate. He will be provided with f/u referral to the general surgeon and his PCP.     Pt will receive IV fluids in the ER.             MEDICAL DECISION MAKING      MDM  Number of Diagnoses or Management Options  Acute alcohol intoxication, uncomplicated:   Acute alcoholic gastritis without hemorrhage:   Alcohol abuse:   PUD (peptic ulcer  disease):      Amount and/or Complexity of Data Reviewed  Clinical lab tests: ordered and reviewed (BAL is 349. WBC is 6.32.)  Decide to obtain previous medical records or to obtain history from someone other than the patient: yes  Review and summarize past medical records: yes (Pt was admitted 08/23/17-08/24/17 secondary to GI bleed, ETOH abuse, and peptic ulcer disease. Pt was started on protonix drip. Pt was advised to avoid NSAIDs and ETOH intake. )    Patient Progress  Patient progress: stable             DIAGNOSIS  Final diagnoses:   PUD (peptic ulcer disease)   Acute alcoholic gastritis without hemorrhage   Acute alcohol intoxication, uncomplicated   Alcohol abuse         DISPOSITION  Pt discharged.    DISCHARGE    Patient discharged in stable condition.    Reviewed implications of results, diagnosis, meds, responsibility to follow up, warning signs and symptoms of possible worsening, potential complications and reasons to return to ER.    Patient/Family voiced understanding of above instructions.    Discussed plan for discharge, as there is no emergent indication for admission.  Pt/family is agreeable and understands need for follow up and repeat testing.  Pt is aware that discharge does not mean that nothing is wrong but it indicates no emergency is present that requires admission and they must continue care with follow-up as given below or physician of their choice.     FOLLOW-UP  Claudia Rivera MD  [address] [facility] [address]  [place] [id number] [phone]    Schedule an appointment as soon as possible for a visit      Fer Martin MD  [address] [facility] [address]  [place] [address]  [phone]    Schedule an appointment as soon as possible for a visit in 1 week           Medication List      New Prescriptions          sucralfate 1 GM/10ML suspension   Commonly known as:  CARAFATE   Take 10 mL by mouth 3 (Three) Times a Day for 30 days.             Latest Documented Vital Signs:  As of 3:26 AM  BP-  91/55 HR- 73 Temp- 98.3 °F (36.8 °C) O2 sat- 99%        --  Documentation assistance provided by melinda Douglas for Mr. Donavon Arizmendi PA-C.  Information recorded by the carleenibstorm was done at my direction and has been verified and validated by me.               Lynnette Douglas  09/15/17 0339       YONI Bolaños  09/15/17 0357

## 2017-09-18 ENCOUNTER — TELEPHONE (OUTPATIENT)
Dept: SOCIAL WORK | Facility: HOSPITAL | Age: 52
End: 2017-09-18

## 2017-09-18 NOTE — TELEPHONE ENCOUNTER
ED f/u phone call: states he is taking prescribed med, but not feeling better yet. Reminded to make f/u kimberly't w/ both PCP and surgeon

## (undated) DEVICE — BITEBLOCK OMNI BLOC

## (undated) DEVICE — CANN NASL CO2 TRULINK W/O2 A/

## (undated) DEVICE — TBG 02 CRUSH RESIST LF CLR 7FT

## (undated) DEVICE — Device: Brand: DEFENDO AIR/WATER/SUCTION AND BIOPSY VALVE

## (undated) DEVICE — FRCP BX RADJAW4 NDL 2.8 240CM LG OG BX40

## (undated) DEVICE — TUBING, SUCTION, 1/4" X 10', STRAIGHT: Brand: MEDLINE